# Patient Record
Sex: MALE | Race: BLACK OR AFRICAN AMERICAN | NOT HISPANIC OR LATINO | URBAN - METROPOLITAN AREA
[De-identification: names, ages, dates, MRNs, and addresses within clinical notes are randomized per-mention and may not be internally consistent; named-entity substitution may affect disease eponyms.]

---

## 2018-09-30 ENCOUNTER — EMERGENCY (EMERGENCY)
Facility: HOSPITAL | Age: 23
LOS: 1 days | Discharge: HOME | End: 2018-09-30
Attending: EMERGENCY MEDICINE
Payer: SELF-PAY

## 2018-09-30 VITALS
DIASTOLIC BLOOD PRESSURE: 91 MMHG | OXYGEN SATURATION: 100 % | HEART RATE: 121 BPM | RESPIRATION RATE: 18 BRPM | SYSTOLIC BLOOD PRESSURE: 131 MMHG | TEMPERATURE: 98 F

## 2018-09-30 VITALS
RESPIRATION RATE: 20 BRPM | DIASTOLIC BLOOD PRESSURE: 82 MMHG | OXYGEN SATURATION: 100 % | HEART RATE: 88 BPM | SYSTOLIC BLOOD PRESSURE: 108 MMHG

## 2018-09-30 LAB
ALBUMIN SERPL ELPH-MCNC: 4.8 G/DL — SIGNIFICANT CHANGE UP (ref 3.3–5)
ALP SERPL-CCNC: 71 U/L — SIGNIFICANT CHANGE UP (ref 40–120)
ALT FLD-CCNC: 18 U/L — SIGNIFICANT CHANGE UP (ref 10–45)
ANION GAP SERPL CALC-SCNC: 15 MMOL/L — SIGNIFICANT CHANGE UP (ref 5–17)
APTT BLD: 30.6 SEC — SIGNIFICANT CHANGE UP (ref 27.5–37.4)
AST SERPL-CCNC: 24 U/L — SIGNIFICANT CHANGE UP (ref 10–40)
BASOPHILS # BLD AUTO: 0 K/UL — SIGNIFICANT CHANGE UP (ref 0–0.2)
BASOPHILS NFR BLD AUTO: 0.5 % — SIGNIFICANT CHANGE UP (ref 0–2)
BILIRUB SERPL-MCNC: 0.4 MG/DL — SIGNIFICANT CHANGE UP (ref 0.2–1.2)
BLD GP AB SCN SERPL QL: NEGATIVE — SIGNIFICANT CHANGE UP
BUN SERPL-MCNC: 12 MG/DL — SIGNIFICANT CHANGE UP (ref 7–23)
CALCIUM SERPL-MCNC: 9.4 MG/DL — SIGNIFICANT CHANGE UP (ref 8.4–10.5)
CHLORIDE SERPL-SCNC: 103 MMOL/L — SIGNIFICANT CHANGE UP (ref 96–108)
CO2 SERPL-SCNC: 23 MMOL/L — SIGNIFICANT CHANGE UP (ref 22–31)
CREAT SERPL-MCNC: 1.18 MG/DL — SIGNIFICANT CHANGE UP (ref 0.5–1.3)
EOSINOPHIL # BLD AUTO: 0.1 K/UL — SIGNIFICANT CHANGE UP (ref 0–0.5)
EOSINOPHIL NFR BLD AUTO: 0.7 % — SIGNIFICANT CHANGE UP (ref 0–6)
ETHANOL SERPL-MCNC: 249 MG/DL — HIGH (ref 0–10)
GLUCOSE SERPL-MCNC: 119 MG/DL — HIGH (ref 70–99)
HCT VFR BLD CALC: 45.5 % — SIGNIFICANT CHANGE UP (ref 39–50)
HGB BLD-MCNC: 15.2 G/DL — SIGNIFICANT CHANGE UP (ref 13–17)
INR BLD: 1.1 RATIO — SIGNIFICANT CHANGE UP (ref 0.88–1.16)
LYMPHOCYTES # BLD AUTO: 2.5 K/UL — SIGNIFICANT CHANGE UP (ref 1–3.3)
LYMPHOCYTES # BLD AUTO: 31.5 % — SIGNIFICANT CHANGE UP (ref 13–44)
MCHC RBC-ENTMCNC: 31 PG — SIGNIFICANT CHANGE UP (ref 27–34)
MCHC RBC-ENTMCNC: 33.3 GM/DL — SIGNIFICANT CHANGE UP (ref 32–36)
MCV RBC AUTO: 93 FL — SIGNIFICANT CHANGE UP (ref 80–100)
MONOCYTES # BLD AUTO: 0.5 K/UL — SIGNIFICANT CHANGE UP (ref 0–0.9)
MONOCYTES NFR BLD AUTO: 6.1 % — SIGNIFICANT CHANGE UP (ref 2–14)
NEUTROPHILS # BLD AUTO: 4.8 K/UL — SIGNIFICANT CHANGE UP (ref 1.8–7.4)
NEUTROPHILS NFR BLD AUTO: 61.3 % — SIGNIFICANT CHANGE UP (ref 43–77)
PLATELET # BLD AUTO: 241 K/UL — SIGNIFICANT CHANGE UP (ref 150–400)
POTASSIUM SERPL-MCNC: 3.1 MMOL/L — LOW (ref 3.5–5.3)
POTASSIUM SERPL-SCNC: 3.1 MMOL/L — LOW (ref 3.5–5.3)
PROT SERPL-MCNC: 7.8 G/DL — SIGNIFICANT CHANGE UP (ref 6–8.3)
PROTHROM AB SERPL-ACNC: 11.9 SEC — SIGNIFICANT CHANGE UP (ref 9.8–12.7)
RBC # BLD: 4.89 M/UL — SIGNIFICANT CHANGE UP (ref 4.2–5.8)
RBC # FLD: 12.1 % — SIGNIFICANT CHANGE UP (ref 10.3–14.5)
RH IG SCN BLD-IMP: POSITIVE — SIGNIFICANT CHANGE UP
SODIUM SERPL-SCNC: 141 MMOL/L — SIGNIFICANT CHANGE UP (ref 135–145)
WBC # BLD: 7.9 K/UL — SIGNIFICANT CHANGE UP (ref 3.8–10.5)
WBC # FLD AUTO: 7.9 K/UL — SIGNIFICANT CHANGE UP (ref 3.8–10.5)

## 2018-09-30 PROCEDURE — 85730 THROMBOPLASTIN TIME PARTIAL: CPT

## 2018-09-30 PROCEDURE — 12002 RPR S/N/AX/GEN/TRNK2.6-7.5CM: CPT

## 2018-09-30 PROCEDURE — 99053 MED SERV 10PM-8AM 24 HR FAC: CPT

## 2018-09-30 PROCEDURE — 85610 PROTHROMBIN TIME: CPT

## 2018-09-30 PROCEDURE — 99285 EMERGENCY DEPT VISIT HI MDM: CPT

## 2018-09-30 PROCEDURE — 86900 BLOOD TYPING SEROLOGIC ABO: CPT

## 2018-09-30 PROCEDURE — 99291 CRITICAL CARE FIRST HOUR: CPT | Mod: 25

## 2018-09-30 PROCEDURE — 86850 RBC ANTIBODY SCREEN: CPT

## 2018-09-30 PROCEDURE — 85027 COMPLETE CBC AUTOMATED: CPT

## 2018-09-30 PROCEDURE — 90471 IMMUNIZATION ADMIN: CPT

## 2018-09-30 PROCEDURE — 71045 X-RAY EXAM CHEST 1 VIEW: CPT

## 2018-09-30 PROCEDURE — 80307 DRUG TEST PRSMV CHEM ANLYZR: CPT

## 2018-09-30 PROCEDURE — 71045 X-RAY EXAM CHEST 1 VIEW: CPT | Mod: 26

## 2018-09-30 PROCEDURE — 90715 TDAP VACCINE 7 YRS/> IM: CPT

## 2018-09-30 PROCEDURE — 80053 COMPREHEN METABOLIC PANEL: CPT

## 2018-09-30 PROCEDURE — 86901 BLOOD TYPING SEROLOGIC RH(D): CPT

## 2018-09-30 RX ORDER — TETANUS TOXOID, REDUCED DIPHTHERIA TOXOID AND ACELLULAR PERTUSSIS VACCINE, ADSORBED 5; 2.5; 8; 8; 2.5 [IU]/.5ML; [IU]/.5ML; UG/.5ML; UG/.5ML; UG/.5ML
0.5 SUSPENSION INTRAMUSCULAR ONCE
Qty: 0 | Refills: 0 | Status: COMPLETED | OUTPATIENT
Start: 2018-09-30 | End: 2018-09-30

## 2018-09-30 RX ORDER — SODIUM CHLORIDE 9 MG/ML
1000 INJECTION INTRAMUSCULAR; INTRAVENOUS; SUBCUTANEOUS ONCE
Qty: 0 | Refills: 0 | Status: COMPLETED | OUTPATIENT
Start: 2018-09-30 | End: 2018-09-30

## 2018-09-30 RX ADMIN — SODIUM CHLORIDE 2000 MILLILITER(S): 9 INJECTION INTRAMUSCULAR; INTRAVENOUS; SUBCUTANEOUS at 03:04

## 2018-09-30 RX ADMIN — TETANUS TOXOID, REDUCED DIPHTHERIA TOXOID AND ACELLULAR PERTUSSIS VACCINE, ADSORBED 0.5 MILLILITER(S): 5; 2.5; 8; 8; 2.5 SUSPENSION INTRAMUSCULAR at 05:17
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
## 2018-09-30 NOTE — PROCEDURE NOTE - ADDITIONAL PROCEDURE DETAILS
Patient prepped and draped in semi sterile fashion in emergency room. Wound washed out copiously with saline. Wound infiltrated with 1% lidocaine. Approximately 12 vertical mattress sutures placed with wound well approximated with skin edges everted. Dry dressing placed. Patient given verbal instructed to return to ED if erythematous, drainage, coming from the wound. Told Patient to follow up with Dr. Meneses in clinic in 2 weeks for suture removal or can return to ED for wound check.
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
## 2018-09-30 NOTE — ED ADULT NURSE NOTE - NSIMPLEMENTINTERV_GEN_ALL_ED
Implemented All Universal Safety Interventions:  [place] to call system. Call bell, personal items and telephone within reach. Instruct patient to call for assistance. Room bathroom lighting operational. Non-slip footwear when patient is off stretcher. Physically safe environment: no spills, clutter or unnecessary equipment. Stretcher in lowest position, wheels locked, appropriate side rails in place.
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
## 2018-09-30 NOTE — PROGRESS NOTE ADULT - SUBJECTIVE AND OBJECTIVE BOX
Asked to evaluate penetrating injury right hand    Wound explored  Transection EPL, EIP and EDC to IF  Wound Closed and splint placed    Will need repair of extensor tendons as outpatient this week  Patient needs to call [phone] First thing Monday morning to schedule followup and for surgical planning    Elevation  Keep splint on and Dry
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
## 2018-10-03 ENCOUNTER — OUTPATIENT (OUTPATIENT)
Dept: OUTPATIENT SERVICES | Facility: HOSPITAL | Age: 23
LOS: 1 days | End: 2018-10-03
Payer: COMMERCIAL

## 2018-10-03 VITALS
RESPIRATION RATE: 18 BRPM | TEMPERATURE: 98 F | OXYGEN SATURATION: 97 % | DIASTOLIC BLOOD PRESSURE: 71 MMHG | SYSTOLIC BLOOD PRESSURE: 108 MMHG | HEIGHT: 67 IN | HEART RATE: 85 BPM | WEIGHT: 164.02 LBS

## 2018-10-03 DIAGNOSIS — Z01.818 ENCOUNTER FOR OTHER PREPROCEDURAL EXAMINATION: ICD-10-CM

## 2018-10-03 DIAGNOSIS — S66.300A: ICD-10-CM

## 2018-10-03 DIAGNOSIS — S91.101A UNSPECIFIED OPEN WOUND OF RIGHT GREAT TOE WITHOUT DAMAGE TO NAIL, INITIAL ENCOUNTER: ICD-10-CM

## 2018-10-03 DIAGNOSIS — M67.90 UNSPECIFIED DISORDER OF SYNOVIUM AND TENDON, UNSPECIFIED SITE: ICD-10-CM

## 2018-10-03 LAB
HCT VFR BLD CALC: 41.6 % — SIGNIFICANT CHANGE UP (ref 39–50)
HGB BLD-MCNC: 13.7 G/DL — SIGNIFICANT CHANGE UP (ref 13–17)
MCHC RBC-ENTMCNC: 30.6 PG — SIGNIFICANT CHANGE UP (ref 27–34)
MCHC RBC-ENTMCNC: 32.9 GM/DL — SIGNIFICANT CHANGE UP (ref 32–36)
MCV RBC AUTO: 93.1 FL — SIGNIFICANT CHANGE UP (ref 80–100)
PLATELET # BLD AUTO: 235 K/UL — SIGNIFICANT CHANGE UP (ref 150–400)
RBC # BLD: 4.47 M/UL — SIGNIFICANT CHANGE UP (ref 4.2–5.8)
RBC # FLD: 12.9 % — SIGNIFICANT CHANGE UP (ref 10.3–14.5)
WBC # BLD: 5.27 K/UL — SIGNIFICANT CHANGE UP (ref 3.8–10.5)
WBC # FLD AUTO: 5.27 K/UL — SIGNIFICANT CHANGE UP (ref 3.8–10.5)

## 2018-10-03 PROCEDURE — 85027 COMPLETE CBC AUTOMATED: CPT

## 2018-10-03 PROCEDURE — G0463: CPT

## 2018-10-03 RX ORDER — LIDOCAINE HCL 20 MG/ML
0.2 VIAL (ML) INJECTION ONCE
Qty: 0 | Refills: 0 | Status: DISCONTINUED | OUTPATIENT
Start: 2018-10-05 | End: 2018-10-20

## 2018-10-03 RX ORDER — CELECOXIB 200 MG/1
200 CAPSULE ORAL ONCE
Qty: 0 | Refills: 0 | Status: COMPLETED | OUTPATIENT
Start: 2018-10-05 | End: 2018-10-05

## 2018-10-03 RX ORDER — ACETAMINOPHEN 500 MG
1000 TABLET ORAL ONCE
Qty: 0 | Refills: 0 | Status: COMPLETED | OUTPATIENT
Start: 2018-10-05 | End: 2018-10-05

## 2018-10-03 RX ORDER — SODIUM CHLORIDE 9 MG/ML
3 INJECTION INTRAMUSCULAR; INTRAVENOUS; SUBCUTANEOUS EVERY 8 HOURS
Qty: 0 | Refills: 0 | Status: DISCONTINUED | OUTPATIENT
Start: 2018-10-05 | End: 2018-10-20

## 2018-10-03 NOTE — H&P PST ADULT - ADDITIONAL PE
right arm with an ace bandage. fingers warm to touch/ brisk capillary refill/ well healing sutures behind left ear
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
## 2018-10-03 NOTE — H&P PST ADULT - NSANTHOSAYNRD_GEN_A_CORE
No. FRENCH screening performed.  STOP BANG Legend: 0-2 = LOW Risk; 3-4 = INTERMEDIATE Risk; 5-8 = HIGH Risk
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
## 2018-10-05 ENCOUNTER — OUTPATIENT (OUTPATIENT)
Dept: OUTPATIENT SERVICES | Facility: HOSPITAL | Age: 23
LOS: 1 days | End: 2018-10-05
Payer: COMMERCIAL

## 2018-10-05 VITALS
HEIGHT: 67 IN | HEART RATE: 61 BPM | DIASTOLIC BLOOD PRESSURE: 70 MMHG | OXYGEN SATURATION: 99 % | SYSTOLIC BLOOD PRESSURE: 108 MMHG | RESPIRATION RATE: 18 BRPM | WEIGHT: 164.02 LBS | TEMPERATURE: 98 F

## 2018-10-05 VITALS
RESPIRATION RATE: 20 BRPM | TEMPERATURE: 98 F | HEART RATE: 86 BPM | DIASTOLIC BLOOD PRESSURE: 66 MMHG | SYSTOLIC BLOOD PRESSURE: 138 MMHG | OXYGEN SATURATION: 98 %

## 2018-10-05 DIAGNOSIS — S66.300A: ICD-10-CM

## 2018-10-05 DIAGNOSIS — S91.101A UNSPECIFIED OPEN WOUND OF RIGHT GREAT TOE WITHOUT DAMAGE TO NAIL, INITIAL ENCOUNTER: ICD-10-CM

## 2018-10-05 PROCEDURE — 26418 REPAIR FINGER TENDON: CPT | Mod: RT

## 2018-10-05 PROCEDURE — 13102 CMPLX RPR TRUNK ADDL 5CM/<: CPT

## 2018-10-05 PROCEDURE — 13101 CMPLX RPR TRUNK 2.6-7.5 CM: CPT

## 2018-10-05 RX ORDER — CEPHALEXIN 500 MG
1 CAPSULE ORAL
Qty: 0 | Refills: 0 | COMMUNITY

## 2018-10-05 RX ORDER — SODIUM CHLORIDE 9 MG/ML
1000 INJECTION, SOLUTION INTRAVENOUS
Qty: 0 | Refills: 0 | Status: DISCONTINUED | OUTPATIENT
Start: 2018-10-05 | End: 2018-10-20

## 2018-10-05 RX ORDER — OXYCODONE HYDROCHLORIDE 5 MG/1
5 TABLET ORAL ONCE
Qty: 0 | Refills: 0 | Status: DISCONTINUED | OUTPATIENT
Start: 2018-10-05 | End: 2018-10-05

## 2018-10-05 RX ORDER — CELECOXIB 200 MG/1
200 CAPSULE ORAL ONCE
Qty: 0 | Refills: 0 | Status: COMPLETED | OUTPATIENT
Start: 2018-10-05 | End: 2018-10-05

## 2018-10-05 RX ORDER — ONDANSETRON 8 MG/1
4 TABLET, FILM COATED ORAL ONCE
Qty: 0 | Refills: 0 | Status: COMPLETED | OUTPATIENT
Start: 2018-10-05 | End: 2018-10-05

## 2018-10-05 RX ADMIN — ONDANSETRON 4 MILLIGRAM(S): 8 TABLET, FILM COATED ORAL at 10:11

## 2018-10-05 RX ADMIN — CELECOXIB 200 MILLIGRAM(S): 200 CAPSULE ORAL at 10:11
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
## 2018-10-05 NOTE — PRE-ANESTHESIA EVALUATION ADULT - NSPROPOSEDPROCEDFT_GEN_ALL_CORE
Repair of right hand extensor tendon; rev. of right chest wall wound
Repair of Extensor Tendon Right Hand possible Revision of Right Chest Wall

## 2018-10-05 NOTE — PRE-ANESTHESIA EVALUATION ADULT - NSANTHOSAYNRD_GEN_A_CORE
No. FRENCH screening performed.  STOP BANG Legend: 0-2 = LOW Risk; 3-4 = INTERMEDIATE Risk; 5-8 = HIGH Risk
No. FRENCH screening performed.  STOP BANG Legend: 0-2 = LOW Risk; 3-4 = INTERMEDIATE Risk; 5-8 = HIGH Risk

## 2018-10-05 NOTE — ASU DISCHARGE PLAN (ADULT/PEDIATRIC). - MEDICATION SUMMARY - MEDICATIONS TO TAKE
I will START or STAY ON the medications listed below when I get home from the hospital:    oxyCODONE-acetaminophen 5 mg-325 mg oral tablet  -- 1  by mouth , As Needed every 4-6 hours since 9-29--30-18  -- Indication: For For pain    cephalexin 500 mg oral capsule  -- 1 cap(s) by mouth 3 times a day started 10-02-18  -- Indication: For Antibiotic

## 2018-10-05 NOTE — ASU DISCHARGE PLAN (ADULT/PEDIATRIC). - NOTIFY
Bleeding that does not stop/Swelling that continues/Persistent Nausea and Vomiting/Numbness, color, or temperature change to extremity/Inability to Tolerate Liquids or Foods
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
## 2018-10-05 NOTE — BRIEF OPERATIVE NOTE - PROCEDURE
<<-----Click on this checkbox to enter Procedure Wound closure, layered, scalp, axillae, trunk or extremities  10/05/2018    Active  LAMONTE  Nerve repair  10/05/2018    Active  ROBBIEREGMAN  Extensor tendon repair of hand  10/05/2018    Active  DBREGMAN

## 2018-10-05 NOTE — PRE-ANESTHESIA EVALUATION ADULT - ANESTHESIA, PREVIOUS REACTION, PROFILE
none/mother states she needs more medication for induction of anesthesia
none/mother states she needs more medication for induction of anesthesia

## 2018-10-05 NOTE — ASU DISCHARGE PLAN (ADULT/PEDIATRIC). - SPECIAL INSTRUCTIONS
Leave the splint on your right hand in place.  You may cover it with a plastic bag to protect it from getting wet while bathing.    Leave the outer dressing on the chest in place for 48 hours.  After 48 hours, you may remove the outer dressing; leave the paper strip bandages underneath the outer dressing in place.  You may get the chest incision wet after 48 hours but do not scrub the incision, simply allow the water to run over the incision and pat dry.

## 2018-10-05 NOTE — ASU DISCHARGE PLAN (ADULT/PEDIATRIC). - ITEMS TO FOLLOWUP WITH YOUR PHYSICIAN'S
Please follow up with Dr. Miranda next Tuesday or Thursday after discharge from the hospital. You may call [phone] to schedule an appointment.

## 2018-10-05 NOTE — BRIEF OPERATIVE NOTE - OPERATION/FINDINGS
Exploration of wound of hand.  Repair of EIP, EPL, EDC of right hand.  Repair of dorsal radial sensory nerve with 8-0 nylon.  Exploration of wound of chest with layered closure.

## 2019-09-23 NOTE — PROCEDURE NOTE - NSSITEPREP_SKIN_A_CORE
Spoke with son. Discussed current status and plan of care. Patient has baseline dementia, likely recurrent aspiration which is a marker of poor prognosis. Baseline quality of life has diminished significantly last several weeks. Son feels that goals of care would be comfort at this time. He would like to discontinue bipap and focus on comfort. He will try to be here this afternoon.    chlorhexidine

## 2022-05-26 NOTE — ASU PREOP CHECKLIST - ISOLATION PRECAUTIONS
[facility]      PROCEDURE: BBR  DATE: 4/21/22    Matt Jackson has been recovering well since her procedure. Pain has been well controlled with prescribed pain medication. EXAM    /74   Pulse 78   Temp 98.7 °F (37.1 °C)   Wt 199 lb (90.3 kg)   SpO2 98%   BMI 33.12 kg/m²     GEN: NAD  BREAST: Incisions healing well. Good contour. Nipples viable. IMP: 36 y. o.female s/p BBR  PLAN: Doing well. Patient happy thus far. Will return in 6 months to ensure wound healing. She also wishes to do an abdminoplasty in the future possible. She wants to reach her goal weight of 180 lbs and will return to discuss.          Anette Ramirez, APRN - [id number] [facility]  [phone]  05/26/22 none

## 2022-06-06 NOTE — H&P PST ADULT - PAIN CHRONIC, PROFILE
Unable to reach (Follow up)    Attempt made to reach the parent/guardian of the patient by telephone. Left message with information to return call. Episode will be resolved. No further follow up will be required at this time.
no

## 2024-08-06 NOTE — H&P PST ADULT - BLOOD AVOIDANCE/RESTRICTIONS, PROFILE
Please send letter in mail     Cologuard colon screen was negative and normal     Next one due in 3 years unless symptoms or concerns arise      Let me know if any questions.          Sent letter stating above information   none

## 2025-01-21 NOTE — ED PROCEDURE NOTE - LENGTH OF LACERATION
Lee Osorio MD, [facility]  Pulmonology and Critical Care Medicine      3 2

## 2025-02-26 NOTE — PRE-ANESTHESIA EVALUATION ADULT - NSANTHDIETYNSD_GEN_ALL_CORE
Patient: Becky Decker    Procedure: * No procedures listed *       Diagnosis: * No pre-op diagnosis entered *  Diagnosis Additional Information: No value filed.    Anesthesia Type:   General     Note:    Oropharynx: oropharynx clear of all foreign objects  Level of Consciousness: drowsy  Oxygen Supplementation: room air    Independent Airway: airway patency satisfactory and stable  Dentition: dentition unchanged  Vital Signs Stable: post-procedure vital signs reviewed and stable  Report to RN Given: handoff report given  Patient transferred to: PACU    Handoff Report: Identifed the Patient, Identified the Reponsible Provider, Reviewed the pertinent medical history, Discussed the surgical course, Reviewed Intra-OP anesthesia mangement and issues during anesthesia, Set expectations for post-procedure period and Allowed opportunity for questions and acknowledgement of understanding      Vitals:  Vitals Value Taken Time   BP     Temp     Pulse     Resp     SpO2         Electronically Signed By: Gerard Brown MD  February 26, 2025  1:42 PM  
Yes/10pm, 10/4
10pm last night/Yes

## 2025-06-24 ENCOUNTER — INPATIENT (INPATIENT)
Facility: HOSPITAL | Age: 30
LOS: 8 days | Discharge: HOME | End: 2025-07-03
Attending: STUDENT IN AN ORGANIZED HEALTH CARE EDUCATION/TRAINING PROGRAM | Admitting: STUDENT IN AN ORGANIZED HEALTH CARE EDUCATION/TRAINING PROGRAM
Payer: MEDICAID

## 2025-06-24 VITALS
SYSTOLIC BLOOD PRESSURE: 114 MMHG | OXYGEN SATURATION: 100 % | HEART RATE: 100 BPM | HEIGHT: 68 IN | DIASTOLIC BLOOD PRESSURE: 70 MMHG | WEIGHT: 149.91 LBS | TEMPERATURE: 98 F | RESPIRATION RATE: 18 BRPM

## 2025-06-24 LAB
AMPHET UR-MCNC: NEGATIVE — SIGNIFICANT CHANGE UP
BARBITURATES UR SCN-MCNC: NEGATIVE — SIGNIFICANT CHANGE UP
BENZODIAZ UR-MCNC: NEGATIVE — SIGNIFICANT CHANGE UP
COCAINE METAB.OTHER UR-MCNC: NEGATIVE — SIGNIFICANT CHANGE UP
CREATININE URINE RESULT, DAU: 28 MG/DL — SIGNIFICANT CHANGE UP
FENTANYL UR QL SCN: NEGATIVE — SIGNIFICANT CHANGE UP
METHADONE UR-MCNC: NEGATIVE — SIGNIFICANT CHANGE UP
OPIATES UR-MCNC: NEGATIVE — SIGNIFICANT CHANGE UP
OXYCODONE UR-MCNC: NEGATIVE — SIGNIFICANT CHANGE UP
PCP SPEC-MCNC: SIGNIFICANT CHANGE UP
PCP UR-MCNC: NEGATIVE — SIGNIFICANT CHANGE UP
THC UR QL: NEGATIVE — SIGNIFICANT CHANGE UP

## 2025-06-24 RX ORDER — KETOROLAC TROMETHAMINE 30 MG/ML
30 INJECTION, SOLUTION INTRAMUSCULAR; INTRAVENOUS ONCE
Refills: 0 | Status: DISCONTINUED | OUTPATIENT
Start: 2025-06-24 | End: 2025-06-25

## 2025-06-24 NOTE — ED ADULT TRIAGE NOTE - CHIEF COMPLAINT QUOTE
s/p basketball injury earlier today, pt states he did not fall but has noticed pain and swelling in right ankle and right knee, knee visibly swollen, pt unable to bear weight on affected left. Pt denies CP, SOB, N/V/D. Fever and Chills. Denies Hx

## 2025-06-24 NOTE — ED ADULT NURSE NOTE - OBJECTIVE STATEMENT
Pt presents to wellness c/o right knee pain & swelling after playing basketball and coming down hard on right leg. Pt having difficulty bearing weight. Pt also requesting a drug screen before XR and pain meds. states he does not do drugs. urine sent. pending XR res

## 2025-06-24 NOTE — ED PROVIDER NOTE - MUSCULOSKELETAL, MLM
Moderate edema of R knee with decreased ROM. No bony tenderness. intact distal pulses. No ecchymosis or erythema.

## 2025-06-24 NOTE — ED PROVIDER NOTE - PROGRESS NOTE DETAILS
Pt transferred to main ED given psych concerns. Offered knee immobilizer but pt is refusing.   Psych to evaluate. Pt placed on 1:1. Unable to go into  given unable to ambulate due to knee injury

## 2025-06-24 NOTE — ED PROVIDER NOTE - OBJECTIVE STATEMENT
29-year-old male no reported past medical history presents to the emergency department right knee injury.  Patient states he was playing basketball and came down on right knee.  Patient having pain and swelling since.  Patient have a decreased range of motion of right knee.  Patient reports moderate amount of swelling.  Patient denies any other injury or complaint at this time. 29-year-old male no reported past medical history presents to the emergency department right knee injury.  Patient states he was playing basketball and came down on right knee.  Patient having pain and swelling since.  Patient have a decreased range of motion of right knee.  Patient reports moderate amount of swelling.  Patient denies any other injury or complaint at this time.  Pt also requesting drug screen however does not report using any drugs, 29-year-old male no reported past medical history presents to the emergency department right knee injury.  Patient states he was playing basketball and came down on right knee.  Patient having pain and swelling since.  Patient have a decreased range of motion of right knee.  Patient reports moderate amount of swelling.  Patient denies any other injury or complaint at this time.  Pt also requesting drug screen however does not report using any drugs. feels paranoid that he might have been drugged.   Spoke with mother for collateral who states pt has been very paranoid lately, feeling like he is being watched and followed by family. Mother states he stays in basement most of the day and can become verbally aggressive. Mother is concerned about her safety and her family's safety.

## 2025-06-24 NOTE — ED PROVIDER NOTE - NSFOLLOWUPINSTRUCTIONS_ED_ALL_ED_FT
Follow up with either sports medicine clinic or orthopedics within 1 week  Apply Ice daily   keep knee in immobilizer. Use crutches to ambulate      Sprain    A sprain is a stretch or tear in one of the tough, fiber-like tissues (ligaments) in your body. This is caused by an injury to the area such as a twisting mechanism. Symptoms include pain, swelling, or bruising. Rest that area over the next several days and slowly resume activity when tolerated. Ice can help with swelling and pain.     SEEK IMMEDIATE MEDICAL CARE IF YOU HAVE ANY OF THE FOLLOWING SYMPTOMS: worsening pain, inability to move that body part, numbness or tingling.

## 2025-06-24 NOTE — ED PROVIDER NOTE - CLINICAL SUMMARY MEDICAL DECISION MAKING FREE TEXT BOX
29-year-old male no reported past medical history presents to the emergency department right knee injury.  Patient states he was playing basketball and came down on right knee.  Patient having pain and swelling since.  Patient have a decreased range of motion of right knee.  Patient reports moderate amount of swelling.  Patient denies any other injury or complaint at this time.    likely ligamental injury    plan  - xr knee  - knee immobilizer   - ice, ace bandage  - toradol  - follow up with sports medicine, ortho 29-year-old male no reported past medical history presents to the emergency department right knee injury.  Patient states he was playing basketball and came down on right knee.  Patient having pain and swelling since.  Patient have a decreased range of motion of right knee.  Patient reports moderate amount of swelling.  Patient denies any other injury or complaint at this time.  Pt also requesting drug screen however does not report using any drugs,     likely ligamental injury    plan  - xr knee  - knee immobilizer   - ice, ace bandage  - toradol  - follow up with sports medicine, ortho 29-year-old male no reported past medical history presents to the emergency department right knee injury.  Patient states he was playing basketball and came down on right knee.  Patient having pain and swelling since.  Patient have a decreased range of motion of right knee.  Patient reports moderate amount of swelling.  Patient denies any other injury or complaint at this time.  Pt also requesting drug screen however does not report using any drugs. feels paranoid that he might have been drugged.   Spoke with mother for collateral who states pt has been very paranoid lately, feeling like he is being watched and followed by family. Mother states he stays in basement most of the day and can become verbally aggressive. Mother is concerned about her safety and her family's safety.     likely ligamental injury    plan  xr RLE  psych  consult given paranoid delusions  will move to main on 1:1
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
## 2025-06-24 NOTE — ED PROVIDER NOTE - NEUROLOGICAL, MLM
Tretinoin Approved    Filling Pharmacy: [facility]   Alert and oriented, no focal deficits, no motor or sensory deficits.

## 2025-06-25 DIAGNOSIS — F29 UNSPECIFIED PSYCHOSIS NOT DUE TO A SUBSTANCE OR KNOWN PHYSIOLOGICAL CONDITION: ICD-10-CM

## 2025-06-25 LAB
ALBUMIN SERPL ELPH-MCNC: 4.7 G/DL — SIGNIFICANT CHANGE UP (ref 3.3–5)
ALP SERPL-CCNC: 57 U/L — SIGNIFICANT CHANGE UP (ref 40–120)
ALT FLD-CCNC: 17 U/L — SIGNIFICANT CHANGE UP (ref 4–41)
ANION GAP SERPL CALC-SCNC: 16 MMOL/L — HIGH (ref 7–14)
APAP SERPL-MCNC: <10 UG/ML — LOW (ref 15–25)
AST SERPL-CCNC: 27 U/L — SIGNIFICANT CHANGE UP (ref 4–40)
BASOPHILS # BLD AUTO: 0.05 K/UL — SIGNIFICANT CHANGE UP (ref 0–0.2)
BASOPHILS NFR BLD AUTO: 0.5 % — SIGNIFICANT CHANGE UP (ref 0–2)
BILIRUB SERPL-MCNC: 1.8 MG/DL — HIGH (ref 0.2–1.2)
BUN SERPL-MCNC: 6 MG/DL — LOW (ref 7–23)
CALCIUM SERPL-MCNC: 9 MG/DL — SIGNIFICANT CHANGE UP (ref 8.4–10.5)
CHLORIDE SERPL-SCNC: 102 MMOL/L — SIGNIFICANT CHANGE UP (ref 98–107)
CO2 SERPL-SCNC: 20 MMOL/L — LOW (ref 22–31)
CREAT SERPL-MCNC: 0.9 MG/DL — SIGNIFICANT CHANGE UP (ref 0.5–1.3)
EGFR: 119 ML/MIN/1.73M2 — SIGNIFICANT CHANGE UP
EGFR: 119 ML/MIN/1.73M2 — SIGNIFICANT CHANGE UP
EOSINOPHIL # BLD AUTO: 0.02 K/UL — SIGNIFICANT CHANGE UP (ref 0–0.5)
EOSINOPHIL NFR BLD AUTO: 0.2 % — SIGNIFICANT CHANGE UP (ref 0–6)
ETHANOL SERPL-MCNC: <10 MG/DL — SIGNIFICANT CHANGE UP
GLUCOSE SERPL-MCNC: 85 MG/DL — SIGNIFICANT CHANGE UP (ref 70–99)
HCT VFR BLD CALC: 40.7 % — SIGNIFICANT CHANGE UP (ref 39–50)
HGB BLD-MCNC: 14 G/DL — SIGNIFICANT CHANGE UP (ref 13–17)
IMM GRANULOCYTES # BLD AUTO: 0.02 K/UL — SIGNIFICANT CHANGE UP (ref 0–0.07)
IMM GRANULOCYTES NFR BLD AUTO: 0.2 % — SIGNIFICANT CHANGE UP (ref 0–0.9)
LYMPHOCYTES # BLD AUTO: 1.09 K/UL — SIGNIFICANT CHANGE UP (ref 1–3.3)
LYMPHOCYTES NFR BLD AUTO: 10.3 % — LOW (ref 13–44)
MCHC RBC-ENTMCNC: 31.3 PG — SIGNIFICANT CHANGE UP (ref 27–34)
MCHC RBC-ENTMCNC: 34.4 G/DL — SIGNIFICANT CHANGE UP (ref 32–36)
MCV RBC AUTO: 90.8 FL — SIGNIFICANT CHANGE UP (ref 80–100)
MONOCYTES # BLD AUTO: 0.66 K/UL — SIGNIFICANT CHANGE UP (ref 0–0.9)
MONOCYTES NFR BLD AUTO: 6.2 % — SIGNIFICANT CHANGE UP (ref 2–14)
NEUTROPHILS # BLD AUTO: 8.77 K/UL — HIGH (ref 1.8–7.4)
NEUTROPHILS NFR BLD AUTO: 82.6 % — HIGH (ref 43–77)
NRBC # BLD AUTO: 0 K/UL — SIGNIFICANT CHANGE UP (ref 0–0)
NRBC # FLD: 0 K/UL — SIGNIFICANT CHANGE UP (ref 0–0)
NRBC BLD AUTO-RTO: 0 /100 WBCS — SIGNIFICANT CHANGE UP (ref 0–0)
PLATELET # BLD AUTO: 188 K/UL — SIGNIFICANT CHANGE UP (ref 150–400)
PMV BLD: 10.6 FL — SIGNIFICANT CHANGE UP (ref 7–13)
POTASSIUM SERPL-MCNC: 3.4 MMOL/L — LOW (ref 3.5–5.3)
POTASSIUM SERPL-SCNC: 3.4 MMOL/L — LOW (ref 3.5–5.3)
PROT SERPL-MCNC: 7.3 G/DL — SIGNIFICANT CHANGE UP (ref 6–8.3)
RBC # BLD: 4.48 M/UL — SIGNIFICANT CHANGE UP (ref 4.2–5.8)
RBC # FLD: 12.3 % — SIGNIFICANT CHANGE UP (ref 10.3–14.5)
SALICYLATES SERPL-MCNC: <0.3 MG/DL — LOW (ref 15–30)
SODIUM SERPL-SCNC: 138 MMOL/L — SIGNIFICANT CHANGE UP (ref 135–145)
TOXICOLOGY SCREEN, DRUGS OF ABUSE, SERUM RESULT: SIGNIFICANT CHANGE UP
TSH SERPL-MCNC: 1.06 UIU/ML — SIGNIFICANT CHANGE UP (ref 0.27–4.2)
WBC # BLD: 10.61 K/UL — HIGH (ref 3.8–10.5)
WBC # FLD AUTO: 10.61 K/UL — HIGH (ref 3.8–10.5)

## 2025-06-25 PROCEDURE — 99285 EMERGENCY DEPT VISIT HI MDM: CPT

## 2025-06-25 RX ORDER — HALOPERIDOL 10 MG/1
5 TABLET ORAL ONCE
Refills: 0 | Status: DISCONTINUED | OUTPATIENT
Start: 2025-06-25 | End: 2025-06-25

## 2025-06-25 RX ORDER — LORAZEPAM 4 MG/ML
2 VIAL (ML) INJECTION ONCE
Refills: 0 | Status: DISCONTINUED | OUTPATIENT
Start: 2025-06-25 | End: 2025-06-25

## 2025-06-25 RX ORDER — HALOPERIDOL 10 MG/1
5 TABLET ORAL EVERY 6 HOURS
Refills: 0 | Status: DISCONTINUED | OUTPATIENT
Start: 2025-06-25 | End: 2025-07-03

## 2025-06-25 RX ORDER — LORAZEPAM 4 MG/ML
2 VIAL (ML) INJECTION ONCE
Refills: 0 | Status: DISCONTINUED | OUTPATIENT
Start: 2025-06-25 | End: 2025-07-01

## 2025-06-25 RX ORDER — HALOPERIDOL 10 MG/1
5 TABLET ORAL ONCE
Refills: 0 | Status: DISCONTINUED | OUTPATIENT
Start: 2025-06-25 | End: 2025-07-03

## 2025-06-25 RX ORDER — RISPERIDONE 4 MG
1 TABLET ORAL AT BEDTIME
Refills: 0 | Status: DISCONTINUED | OUTPATIENT
Start: 2025-06-25 | End: 2025-06-30

## 2025-06-25 RX ORDER — LORAZEPAM 4 MG/ML
2 VIAL (ML) INJECTION EVERY 6 HOURS
Refills: 0 | Status: DISCONTINUED | OUTPATIENT
Start: 2025-06-25 | End: 2025-07-01

## 2025-06-25 RX ADMIN — KETOROLAC TROMETHAMINE 30 MILLIGRAM(S): 30 INJECTION, SOLUTION INTRAMUSCULAR; INTRAVENOUS at 10:35

## 2025-06-25 NOTE — BH INPATIENT PSYCHIATRY ASSESSMENT NOTE - NSBHLEGALSTATUSDT_PSY_ALL_CORE
[FreeTextEntry1] : 3 views of the patient's left foot ordered and reviewed by me personally.  X-rays demonstrate evidence of a well corrected hallux valgus deformity status post minimally invasive Akin and chevron osteotomy.
25-Jun-2025

## 2025-06-25 NOTE — BH INPATIENT PSYCHIATRY ASSESSMENT NOTE - NSBHCHARTREVIEWVS_PSY_A_CORE FT
Vital Signs Last 24 Hrs  T(C): 37.2 (06-25-25 @ 10:04), Max: 37.2 (06-25-25 @ 10:04)  T(F): 99 (06-25-25 @ 10:04), Max: 99 (06-25-25 @ 10:04)  HR: 89 (06-25-25 @ 10:04) (80 - 100)  BP: 113/72 (06-25-25 @ 10:04) (110/60 - 129/71)  BP(mean): --  RR: 18 (06-25-25 @ 10:04) (16 - 18)  SpO2: 100% (06-25-25 @ 10:04) (95% - 100%)     Vital Signs Last 24 Hrs  T(C): 36.7 (06-25-25 @ 12:35), Max: 37.2 (06-25-25 @ 10:04)  T(F): 98.1 (06-25-25 @ 12:35), Max: 99 (06-25-25 @ 10:04)  HR: 89 (06-25-25 @ 10:04) (80 - 100)  BP: 113/72 (06-25-25 @ 10:04) (110/60 - 129/71)  BP(mean): --  RR: 16 (06-25-25 @ 12:35) (16 - 18)  SpO2: 100% (06-25-25 @ 10:04) (95% - 100%)    Orthostatic VS  06-25-25 @ 12:35  Lying BP: --/-- HR: --  Sitting BP: 122/86 HR: 87  Standing BP: 121/86 HR: 106  Site: upper left arm  Mode: electronic   Vital Signs Last 24 Hrs  T(C): 37.2 (06-26-25 @ 08:37), Max: 37.2 (06-26-25 @ 08:37)  T(F): 98.9 (06-26-25 @ 08:37), Max: 98.9 (06-26-25 @ 08:37)  HR: --  BP: --  BP(mean): --  RR: 18 (06-26-25 @ 07:34) (16 - 18)  SpO2: --    Orthostatic VS  06-26-25 @ 08:37  Lying BP: --/-- HR: --  Sitting BP: 115/84 HR: 97  Standing BP: 117/79 HR: 114  Site: --  Mode: electronic  Orthostatic VS  06-25-25 @ 12:35  Lying BP: --/-- HR: --  Sitting BP: 122/86 HR: 87  Standing BP: 121/86 HR: 106  Site: upper left arm  Mode: electronic

## 2025-06-25 NOTE — ED BEHAVIORAL HEALTH ASSESSMENT NOTE - DESCRIPTION
denies Lives with family in [place], employed, never , poor eye contact, wearing dark glasses.   ICU Vital Signs Last 24 Hrs  T(C): 36.7 (24 Jun 2025 20:45), Max: 36.7 (24 Jun 2025 20:45)  T(F): 98 (24 Jun 2025 20:45), Max: 98 (24 Jun 2025 20:45)  HR: 100 (24 Jun 2025 20:45) (100 - 100)  BP: 114/70 (24 Jun 2025 20:45) (114/70 - 114/70)  BP(mean): --  ABP: --  ABP(mean): --  RR: 18 (24 Jun 2025 20:45) (18 - 18)  SpO2: 100% (24 Jun 2025 20:45) (100% - 100%)    O2 Parameters below as of 24 Jun 2025 20:45  Patient On (Oxygen Delivery Method): room air Lives with family in [place], employed, never , no reported access to guns
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
## 2025-06-25 NOTE — ED BEHAVIORAL HEALTH NOTE - BEHAVIORAL HEALTH NOTE
[facility]  Reference #: [id number] - NO CONTROLLED SUBSTANCES PRESCRIBED     PSYCKES: NONE    [facility] UNIFIED COURT SYSTEM/ [employer] - no pending legal issues listed

## 2025-06-25 NOTE — ED BEHAVIORAL HEALTH ASSESSMENT NOTE - SUMMARY
Patient is a 29 year old, male; domicile with parents and siblings; single; noncaregiver; employed; never received formal psychiatric treatment,  no hospitalizations; no known suicide attempts; ; no active substance abuse or known history of complicated withdrawal; h/o aggression with family and friends; PMH unremarkable; arrived by [employer] for c/o left knee pain; psychiatry consulted for paranoia.     Patient seen and evaluated. He was observed wearing dark sunglasses with poor eye contact throughout interview. He appeared paranoid and refused to take off his glasses or change into a hospital gown. Patient was superficially guarded and denied all symptoms of psychosis, depression and rody.   Collateral from mother and sister reported patient has been showing symptoms of psychosis for several years but refused treatment. Recently patient has been getting aggressive to family members, laughing to self and making bizarre statements. Patient has made repeated statements that's random people are out to get him and feels unsafe in his home. Patient is not sleeping and barely eating and lost a lot of weight. Family feels patient is a danger to self and others and are advocating for admission.   Patient is not in agreement and will be admitted on an involuntary status. At this time there are no beds and patient will board in the ED. Patient is a 29 year old, male; domicile with parents and siblings; single; noncaregiver; employed; never received formal psychiatric treatment,  no hospitalizations; no known suicide attempts; ; no active substance abuse or known history of complicated withdrawal; h/o aggression with family and friends; PMH unremarkable; reportedly arrived by [employer] for c/o knee pain; psychiatry consulted for paranoia/ agitation    Patient seen and evaluated. He was observed wearing dark sunglasses with poor eye contact throughout interview. He appeared paranoid and refused to take off his glasses or change into a hospital gown. Patient was superficially guarded and denied all symptoms of psychosis, depression and rody.     Collateral from mother and sister reported patient has been showing symptoms of psychosis for several years but refused treatment. Recently patient has been getting aggressive to family members, laughing to self and making bizarre statements. Patient has made repeated statements that's random people are out to get him and feels unsafe in his home. Patient is not sleeping and barely eating and lost a lot of weight. Family feels patient is a danger to self and others and are advocating for admission.     Patient is not in agreement and will be admitted on an involuntary status. At this time there are no beds and patient will board in the ED. Patient is a 29 year old, male; domicile with parents and siblings; single; noncaregiver; employed; never received formal psychiatric treatment,  no hospitalizations; no known suicide attempts; ; no active substance abuse or known history of complicated withdrawal; h/o aggression with family and friends; PMH unremarkable; reportedly arrived by [employer] for c/o knee pain; psychiatry consulted for paranoia/ agitation    Patient seen and evaluated. He was observed wearing dark sunglasses with poor eye contact throughout interview. He appeared paranoid and refused to take off his glasses or change into a hospital gown. Patient was superficially guarded and denied all symptoms of psychosis, depression and rody.     Collateral from mother and sister reported patient has been showing symptoms of psychosis for several years but refused treatment. Recently patient has been getting aggressive to family members, laughing to self and making bizarre statements. Patient has made repeated statements that's random people are out to get him and feels unsafe in his home. Patient is not sleeping and barely eating and lost a lot of weight. Family feels patient is a danger to self and others and are advocating for admission.     Patient is not in agreement and will be admitted on an involuntary status.
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
## 2025-06-25 NOTE — ED ADULT NURSE REASSESSMENT NOTE - NS ED NURSE REASSESS COMMENT FT1
830am: Received report from PM RN. Patient is alert and oriented times 3. Patient offered and accepted breakfast. Patient is calm and cooperative. Waiting for acceptance to [facility] and bed to be cleared.  SERENA Johnson
Belongings and valuables collected and secured in  high acuity intake area [address].
Pt calm and cooperative, to be transported to [facility] with PES and security, Belongings returned, EKG complete, report given to RN at [facility]
Patient is received from  and was started on constant observation upon arrival. Patient is currently denying any SI, HI, A/V hallucinations. Denying any paranoia at this time, reports that the only reason he feels he needs to be in the hospital is because of the pain in his right knee from the basketball injury that occurred earlier today. Patient has a 18 gauge IV placed to his left AC, lab work sent. Denying any pain medication at this time. Pending Xray results. plan of care ongoing.
Advanced care planning was discussed with patient and family.  Advanced care planning forms were reviewed and discussed as appropriate.  Differential diagnosis and plan of care discussed with patient after the evaluation.   Pain assessed and judicious use of narcotics when appropriate was discussed.  Importance of Fall prevention discussed.  Counseling on Smoking and Alcohol cessation was offered when appropriate.  Counseling on Diet, exercise, and medication compliance was done.     Approx 60 minutes spent.

## 2025-06-25 NOTE — BH INPATIENT PSYCHIATRY ASSESSMENT NOTE - CURRENT MEDICATION
MEDICATIONS  (STANDING):    MEDICATIONS  (PRN):   MEDICATIONS  (STANDING):  risperiDONE   Tablet 1 milliGRAM(s) Oral at bedtime    MEDICATIONS  (PRN):  haloperidol     Tablet 5 milliGRAM(s) Oral every 6 hours PRN Mild-moderate agitation, secondary to psychosis, rody, or poor impulse control  haloperidol    Injectable 5 milliGRAM(s) IntraMuscular once PRN Severe agitation secondary to  neuropsychiatric disorder  LORazepam     Tablet 2 milliGRAM(s) Oral every 6 hours PRN Mild-moderate agitation secondary to psychosis, rody, or poor impulse, Mild-moderate anxiety  LORazepam   Injectable 2 milliGRAM(s) IntraMuscular once PRN Severe agitation secondary to psychosis, rody or poor impulse control. Severe anxiety

## 2025-06-25 NOTE — BH INPATIENT PSYCHIATRY ASSESSMENT NOTE - OTHER
guarded and superficial throughout interview not obseved ambulating with walker, reports pain in right knee and foot

## 2025-06-25 NOTE — ED BEHAVIORAL HEALTH ASSESSMENT NOTE - PSYCHIATRIC ISSUES AND PLAN (INCLUDE STANDING AND PRN MEDICATION)
will defer to treatment team - recommend Risperdal 1 mg hs and titrate as tolderated. Ativan 2 mg Haldol 5 mg po/i,m q6h prn agitation will defer to treatment team - recommend Risperdal 1 mg hs and titrate as tolderated. Ativan 2 mg Haldol 5 mg  po/i,m q6h prn agitation If giving Haldol monitor QTC and  hodl if QTC is greater then 500 will defer to treatment team - recommend Risperdal 1 mg hs and titrate as tolerated. Ativan 2 mg Haldol 5 mg  po/i,m q6h prn agitation If giving Haldol monitor QTC and  hodl if QTC is greater then 500m/s

## 2025-06-25 NOTE — ED BEHAVIORAL HEALTH ASSESSMENT NOTE - RISK ASSESSMENT
low risk- denies suicidal ideation or depressed mood, no prior suicide attempts, afraid of dying, has supportive family,   risk- psychosis

## 2025-06-25 NOTE — BH INPATIENT PSYCHIATRY ASSESSMENT NOTE - NSBHCHARTREVIEWLAB_PSY_A_CORE FT
The patient is a 18y Male complaining of allergic reaction. Admission labs reviewed no acute findings  BAL <10   utox negative  UA unremarkable  TSH 1.03  WBC mildly elevated at 10.61 but pt denying constitutional sxs,

## 2025-06-25 NOTE — BH INPATIENT PSYCHIATRY ASSESSMENT NOTE - NSBHATTESTTYPEVISIT_PSY_A_CORE
Attending evaluating patient with TIFFANIE ([id number] code) On-site Attending supervising TIFFANIE ([id number] codes)

## 2025-06-25 NOTE — ED BEHAVIORAL HEALTH ASSESSMENT NOTE - NSBHATTESTCOMMENTATTENDFT_PSY_A_CORE
29/M with no established psych hx; denied being in psych care; has no reported hx of in-pt psych admissions; denied any hx of SA nor engaged in any NSSIB; denied any illicit substance use including alcohol.  no pertinent medical issues.  tonight, presented to the ED accompanied by family complaining of right knee pain + swelling as well as right ankle pain following basketball injury earlier today.  Psych was consulted as family raised concern that Pt has been increasingly paranoid, aggressive and threatening    at this time, Pt is exhibiting hallmarks of psychosis manifesting as illogicality in his TP; has impaired reasoning.  he is emotionally dysregulated (which is psychotically driven) and remains unpredictable.  Pt is paranoid; attempts at minimizing symptoms; is suspicious and guarded. with poor insight and impaired judgement.  collateral information was obtained from his family who reported that the Pt has been increasingly isolating self and struggling with his ADLs. Mother reported Pt is paranoid, e.g. reported that Pt feels being monitored by unknown individuals, putting devices in his room.    currently, does not appear to be severely depressed; does exhibit anxiety/ jitteriness but this is NOT due to a primary affective symptom.  rather, anxiety is borne out of his paranoia.  Pt is not manic.  does not appear to be acutely intoxicated.  is not delirious nor catatonic.     Ongoing psychotic symptoms causing severe functional impairment.  more so, given psychotic agitation, Pt poses a risk to others.  he will benefit from psych admission aimed at stabilization and ensuring safety.  once he is medically cleared, to facilitate transfer to in-Pt psych unit for further management (on 9.27 status - mother is applicant)       RECOMMENDATIONS:   1. defer standing psychotropic to primary treatment team  2. PRNs: haldol 5mg PO/IM + ativan 2mg PO/IM q6Hrs for psychotic agitation. defer antipsychotic if there is sustained qtc prolongation at >/= 500m/s  3. pain meds PRN  4. temporarily boarding at the ED as no beds available   - discussed with ED staff

## 2025-06-25 NOTE — BH INPATIENT PSYCHIATRY ASSESSMENT NOTE - NSBHMETABOLIC_PSY_ALL_CORE_FT
BMI: BMI (kg/m2): 22.8 (06-24-25 @ 20:45)  HbA1c:   Glucose:   BP: 113/72 (06-25-25 @ 10:04) (110/60 - 129/71)Vital Signs Last 24 Hrs  T(C): 37.2 (06-25-25 @ 10:04), Max: 37.2 (06-25-25 @ 10:04)  T(F): 99 (06-25-25 @ 10:04), Max: 99 (06-25-25 @ 10:04)  HR: 89 (06-25-25 @ 10:04) (80 - 100)  BP: 113/72 (06-25-25 @ 10:04) (110/60 - 129/71)  BP(mean): --  RR: 18 (06-25-25 @ 10:04) (16 - 18)  SpO2: 100% (06-25-25 @ 10:04) (95% - 100%)      Lipid Panel:  BMI: BMI (kg/m2): 22.9 (06-25-25 @ 12:35)  HbA1c:   Glucose:   BP: 113/72 (06-25-25 @ 10:04) (110/60 - 129/71)Vital Signs Last 24 Hrs  T(C): 36.7 (06-25-25 @ 12:35), Max: 37.2 (06-25-25 @ 10:04)  T(F): 98.1 (06-25-25 @ 12:35), Max: 99 (06-25-25 @ 10:04)  HR: 89 (06-25-25 @ 10:04) (80 - 100)  BP: 113/72 (06-25-25 @ 10:04) (110/60 - 129/71)  BP(mean): --  RR: 16 (06-25-25 @ 12:35) (16 - 18)  SpO2: 100% (06-25-25 @ 10:04) (95% - 100%)    Orthostatic VS  06-25-25 @ 12:35  Lying BP: --/-- HR: --  Sitting BP: 122/86 HR: 87  Standing BP: 121/86 HR: 106  Site: upper left arm  Mode: electronic    Lipid Panel:  BMI: BMI (kg/m2): 22.9 (06-25-25 @ 12:35)  HbA1c: A1C with Estimated Average Glucose Result: 5.4 % (06-26-25 @ 08:58)    Glucose:   BP: 113/72 (06-25-25 @ 10:04) (110/60 - 129/71)Vital Signs Last 24 Hrs  T(C): 37.2 (06-26-25 @ 08:37), Max: 37.2 (06-26-25 @ 08:37)  T(F): 98.9 (06-26-25 @ 08:37), Max: 98.9 (06-26-25 @ 08:37)  HR: --  BP: --  BP(mean): --  RR: 18 (06-26-25 @ 07:34) (16 - 18)  SpO2: --    Orthostatic VS  06-26-25 @ 08:37  Lying BP: --/-- HR: --  Sitting BP: 115/84 HR: 97  Standing BP: 117/79 HR: 114  Site: --  Mode: electronic  Orthostatic VS  06-25-25 @ 12:35  Lying BP: --/-- HR: --  Sitting BP: 122/86 HR: 87  Standing BP: 121/86 HR: 106  Site: upper left arm  Mode: electronic    Lipid Panel: Date/Time: 06-26-25 @ 08:58  Cholesterol, Serum: 116  LDL Cholesterol Calculated: 32  HDL Cholesterol, Serum: 70  Total Cholesterol/HDL Ration Measurement: --  Triglycerides, Serum: 66

## 2025-06-25 NOTE — BH INPATIENT PSYCHIATRY ASSESSMENT NOTE - ATTENDING COMMENTS
29 year old single male, domiciled n private residence with parents and siblings, no formal PPH, No substance use, no prior inpatient hospitalizationd, pt arrived by Uber for c/o knee pain, medically cleared, psychiatry consulted for paranoia and agitation and admitted to [facility] for further stabilization. On exam, pt is guarded odd, however minimizes psychiatric symptoms, denies AVH/SI/HI. Dx c/w unspecified psychotic d/o, start Risperdal 1 mg qhs
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
## 2025-06-25 NOTE — BH PATIENT PROFILE - FALL HARM RISK - HARM RISK INTERVENTIONS
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
## 2025-06-25 NOTE — ED BEHAVIORAL HEALTH ASSESSMENT NOTE - HPI (INCLUDE ILLNESS QUALITY, SEVERITY, DURATION, TIMING, CONTEXT, MODIFYING FACTORS, ASSOCIATED SIGNS AND SYMPTOMS)
Patient is a 29 year old, male; domicile with parents and siblings; single; noncaregiver; employed; never received formal psychiatric treatment,  no hospitalizations; no known suicide attempts; ; no active substance abuse or known history of complicated withdrawal; h/o aggression with family and friends; PMH unremarkable; arrived by [employer] for c/o left knee pain; psychiatry consulted for paranoia.     Patient observed wearing dark sunglasses in the ED and was starting at his phone while speaking to staff. He report he came to the ED this evening because he injured his knee while playing basket ball. Patient reports his mood has been " good," and he denies any h/o depression. Patient reports he works at an [employer] and " loves his job." Patient reports his sleep and appetite have been good and he gets along well with all family members. Patient denies any symptoms of psychosis including A/V/h, thoughts of paranoia or ideas of reference. He  denies symptoms of rody including having abnormal amounts of energy in the context of not sleeping, racing thoughts or engaging in risky behavior and denies any past or recent substance abuse.     Received collateral from patients sister Judd who accompanied patient has his mother to the hospital. Patient has been increasingly paranoid and is often observed talking and laughing to self. Patient is isolative and does not appear to be going to work He is not sleeping at night and skips meals daily. Patient is frequently looking out their home windows and shutting the blinds because he fears random people are out to get him. Patient has been physically aggressive to family members and family is afraid of him At time he does not appear to make sense when he talks and other times he refuses ot speak with family. Sister feels patient is a danger to self and others and requires inpatient admission. She is not aware of any substances of abuse.    See SW note for collateral from mother. Patient is a 29 year old, male; domicile with parents and siblings; single; noncaregiver; employed; never received formal psychiatric treatment,  no hospitalizations; no known suicide attempts; ; no active substance abuse or known history of complicated withdrawal; h/o aggression with family and friends; PMH unremarkable; arrived by [employer] for c/o knee pain; psychiatry consulted for paranoia and agitation      Patient observed wearing dark sunglasses in the ED and was starting at his phone while speaking to staff. He report he came to the ED this evening because he injured his knee while playing basket ball. Patient reports his mood has been " good," and he denies any h/o depression. Patient reports he works at an [employer] and " loves his job." Patient reports his sleep and appetite have been good and he gets along well with all family members. Patient denies any symptoms of psychosis including A/V/h, thoughts of paranoia or ideas of reference. He  denies symptoms of rody including having abnormal amounts of energy in the context of not sleeping, racing thoughts or engaging in risky behavior and denies any past or recent substance abuse.     Received collateral from patients sister Judd who accompanied patient has his mother to the hospital. Patient has been increasingly paranoid and is often observed talking and laughing to self. Patient is isolative and does not appear to be going to work He is not sleeping at night and skips meals daily. Patient is frequently looking out their home windows and shutting the blinds because he fears random people are out to get him. Patient has been physically aggressive to family members and family is afraid of him At time he does not appear to make sense when he talks and other times he refuses ot speak with family. Sister feels patient is a danger to self and others and requires inpatient admission. She is not aware of any substances of abuse.    See SW note for collateral from mother.

## 2025-06-25 NOTE — ED BEHAVIORAL HEALTH ASSESSMENT NOTE - TIME CONSULT PERFORMED
25-Jun-2025 00:31 Post-Care Instructions: I reviewed with the patient in detail post-care instructions. Patient is to wear sunprotection, and avoid picking at any of the treated lesions. Pt may apply Vaseline to crusted or scabbing areas. Include Z78.9 (Other Specified Conditions Influencing Health Status) As An Associated Diagnosis?: No Medical Necessity Clause: This procedure was medically necessary because the lesions that were treated were: Anesthesia Volume In Cc: 0.5 Treatment Number (Will Not Render If 0): 0 Detail Level: Detailed Consent: The patient's consent was obtained including but not limited to risks of crusting, scabbing, blistering, scarring, darker or lighter pigmentary change, recurrence, incomplete removal and infection. Medical Necessity Information: It is in your best interest to select a reason for this procedure from the list below. All of these items fulfill various CMS LCD requirements except the new and changing color options.

## 2025-06-25 NOTE — ED BEHAVIORAL HEALTH ASSESSMENT NOTE - NSBHATTESTAPPAMEND_PSY_A_CORE
I have personally seen and examined this patient. I fully participated in the care of this patient. I have made amendments to the documentation where appropriate and otherwise agree with the history, physical exam, and plan as documented by the TIFFANIE

## 2025-06-25 NOTE — BH INPATIENT PSYCHIATRY ASSESSMENT NOTE - NSBHASSESSSUMMFT_PSY_ALL_CORE
Patient is a 29 year old single male, no dependents.  Patient domiciles in private residence with parents and siblings, currently employed.  Patient has no formal PPH. No substance use, admitting utox is negative. Patient no prior inpatient hospitalizations. Patient arrived by [employer] for c/o knee pain, he came to the ED this evening because he injured his knee while playing basket ball, xray resulted negative; psychiatry consulted for paranoia and agitation. Patient is hospitalized with a primary problem of psychotic decompensation.      Plan:  >Legal: 9.27  >Obs: Routine, no current SI. no need for CO, patient not expected to pose risk to self or others in controlled inpatient setting  >Psychiatric Meds:   -Risperdal 1mg qhs for psychosis  PRN medications:  For agitation please attempt verbal de-escalation and behavioral intervention first. If patient does not respond to above, may give recommended pp q6h prn, if no contraindications. If patient is refusing PO, remains an imminent danger to self or others and If Patient's  qtc <500, can escalate to IM formulation. If IM antipsychotic is administered, please perform follow-up ECG for QTc  Ativan 2mg oral Q6HR PRN for agitation and anxiety.  Haldol 5mg oral Q6HR PRN for agitation.   >Labs: Admission labs reviewed, no acute findings. WBC mildly elevated but pt denying constitutional sxs, U-tox negative.  Labs pending for tomorrow: A1c and Lipid panel. Hold antipsychotics if QTc >500  >Medical:   No acute concerns. No consultations needed at this time. No indication for CIWA. low current suspicion for intoxication/withdrawal. Patient with consistently stable VS, no visible physical symptoms of withdrawal.   During the course of treatment, will collaborate with medical team to manage medical issues.  >Consulte: Physical therapy  >Diet: Regular  >Social: milieu/structured therapy  >Treatment Interventions: Groups and Individual Therapy/CBT, Motivational counseling for substance abuse related issues.   >Dispo: Collateral and dispo planning pending further symptom and medication optimization   Patient is a 29 year old single male, no dependents.  Patient domiciles in private residence with parents and siblings, currently employed.  Patient has no formal PPH. No substance use, admitting utox is negative. Patient no prior inpatient hospitalizations. Patient arrived by [employer] for c/o knee pain, he came to the ED this evening because he injured his knee while playing basket ball, xray resulted negative; psychiatry consulted for paranoia and agitation. Patient is hospitalized with a primary problem of psychotic decompensation.      Plan:  >Legal: 9.27  >Obs: Routine, no current SI. no need for CO, patient not expected to pose risk to self or others in controlled inpatient setting  >Psychiatric Meds:   -Risperdal 1mg qhs for psychosis  PRN medications:  For agitation please attempt verbal de-escalation and behavioral intervention first. If patient does not respond to above, may give recommended pp q6h prn, if no contraindications. If patient is refusing PO, remains an imminent danger to self or others and If Patient's  qtc <500, can escalate to IM formulation. If IM antipsychotic is administered, please perform follow-up ECG for QTc  Ativan 2mg oral Q6HR PRN for agitation and anxiety.  Haldol 5mg oral Q6HR PRN for agitation.   >Labs: Admission labs reviewed, no acute findings. WBC mildly elevated but pt denying constitutional sxs, U-tox negative.  Labs pending for tomorrow: A1c and Lipid panel. Hold antipsychotics if QTc >500  >Medical:   No acute concerns. No consultations needed at this time. No indication for CIWA. low current suspicion for intoxication/withdrawal. Patient with consistently stable VS, no visible physical symptoms of withdrawal.   During the course of treatment, will collaborate with medical team to manage medical issues.  >Consult: Physical therapy  >Diet: Regular  >Social: milieu/structured therapy  >Treatment Interventions: Groups and Individual Therapy/CBT, Motivational counseling for substance abuse related issues.   >Dispo: Collateral and dispo planning pending further symptom and medication optimization

## 2025-06-25 NOTE — ED BEHAVIORAL HEALTH ASSESSMENT NOTE - MEDICAL RECORD REVIEWED
This 77-year-old female is status post laser ablation of her right great saphenous vein on 6/14 last week. She did real well and did not have much discomfort. We will schedule her bilateral microphlebectomies and bilateral sclerotherapy later. On ultrasound today the vein is closing off nicely right up to the junction with the femoral vein.   Yes

## 2025-06-25 NOTE — BH INPATIENT PSYCHIATRY ASSESSMENT NOTE - HPI (INCLUDE ILLNESS QUALITY, SEVERITY, DURATION, TIMING, CONTEXT, MODIFYING FACTORS, ASSOCIATED SIGNS AND SYMPTOMS)
Patient was seen and evaluated, chart, medications and labs reviewed. Case discussed with nursing team.  On service for this 29 year old single male, no dependents.  Patient domiciles in private residence with parents and siblings, currently employed.  Patient has no formal PPH. No substance use, admitting utox is negative. Patient no prior inpatient hospitalizations. Patient arrived by [employer] for c/o knee pain, he came to the ED this evening because he injured his knee while playing basket ball, xray resulted negative; psychiatry consulted for paranoia and agitation. Patient is hospitalized with a primary problem of psychotic decompensation.  Patient admitted to [facility] on a 9.27 legal status. I have reviewed the initial psychiatric assessment in the electronic medical record, including the history of present illness, past psychiatric history, family/social history (no pertinent changes), and exam, and have confirmed the salient findings dated  6/25/25.  As per chart review, transferring records indicated the following:  Patient is a 29 year old, male; domicile with parents and siblings; single; noncaregiver; employed; never received formal psychiatric treatment,  no hospitalizations; no known suicide attempts; ; no active substance abuse or known history of complicated withdrawal; h/o aggression with family and friends; PMH unremarkable; arrived by [employer] for c/o knee pain; psychiatry consulted for paranoia and agitation   Patient observed wearing dark sunglasses in the ED and was starting at his phone while speaking to staff. He report he came to the ED this evening because he injured his knee while playing basket ball. Patient reports his mood has been " good," and he denies any h/o depression. Patient reports he works at an [employer] and " loves his job." Patient reports his sleep and appetite have been good and he gets along well with all family members. Patient denies any symptoms of psychosis including A/V/h, thoughts of paranoia or ideas of reference. He  denies symptoms of rody including having abnormal amounts of energy in the context of not sleeping, racing thoughts or engaging in risky behavior and denies any past or recent substance abuse.  Received collateral from patients sister Judd who accompanied patient has his mother to the hospital. Patient has been increasingly paranoid and is often observed talking and laughing to self. Patient is isolative and does not appear to be going to work He is not sleeping at night and skips meals daily. Patient is frequently looking out their home windows and shutting the blinds because he fears random people are out to get him. Patient has been physically aggressive to family members and family is afraid of him At time he does not appear to make sense when he talks and other times he refuses ot speak with family. Sister feels patient is a danger to self and others and requires inpatient admission. She is not aware of any substances of abuse.    On ML4:   Patient was seen and evaluated, chart, medications and labs reviewed. Case discussed with nursing team.  On service for this 29 year old single male, no dependents.  Patient domiciles in private residence with parents and siblings, currently employed.  Patient has no formal PPH. No substance use, admitting utox is negative. Patient no prior inpatient hospitalizations. Patient arrived by [employer] for c/o knee pain, he came to the ED this evening because he injured his knee while playing basket ball, xray resulted negative; psychiatry consulted for paranoia and agitation. Patient is hospitalized with a primary problem of psychotic decompensation.  Patient admitted to [facility] on a 9.27 legal status. I have reviewed the initial psychiatric assessment in the electronic medical record, including the history of present illness, past psychiatric history, family/social history (no pertinent changes), and exam, and have confirmed the salient findings dated  6/25/25.  As per chart review, transferring records indicated the following:  Patient is a 29 year old, male; domicile with parents and siblings; single; noncaregiver; employed; never received formal psychiatric treatment,  no hospitalizations; no known suicide attempts; ; no active substance abuse or known history of complicated withdrawal; h/o aggression with family and friends; PMH unremarkable; arrived by [employer] for c/o knee pain; psychiatry consulted for paranoia and agitation   Patient observed wearing dark sunglasses in the ED and was starting at his phone while speaking to staff. He report he came to the ED this evening because he injured his knee while playing basket ball. Patient reports his mood has been " good," and he denies any h/o depression. 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 Patient denies any symptoms of psychosis including A/V/h, thoughts of paranoia or ideas of reference. He  denies symptoms of rody including having abnormal amounts of energy in the context of not sleeping, racing thoughts or engaging in risky behavior and denies any past or recent substance abuse.  Received collateral from patients sister Judd who accompanied patient has his mother to the hospital. Patient has been increasingly paranoid and is often observed talking and laughing to self. Patient is isolative and does not appear to be going to work He is not sleeping at night and skips meals daily. Patient is frequently looking out their home windows and shutting the blinds because he fears random people are out to get him. Patient has been physically aggressive to family members and family is afraid of him At time he does not appear to make sense when he talks and other times he refuses ot speak with family. Sister feels patient is a danger to self and others and requires inpatient admission. She is not aware of any substances of abuse.      On unit ML4: information came from chart review and patient interview  Discussed precipitant events leading  to warrant inpatient hospitalization. Patient reports "I don't know why I'm here" patient reports that he went to ED with his mother due to concerns with pain to his right knee and leg. Reports that he noticed his right knee swollen, he was having difficulty ambulating.  States he believes that he injured his leg from playing basketball.  Patient reports that he does not know why he was admitted psychiatrically.  In regards to his mood pt reports "I'm doing good, looking forward to the summer"  Patient reports that he enjoys his job working at the [employer], enjoys working out at the gym and playing sports with his friends.  He denies any mood dysregulation or anxiety. He denies any hx of or current SA/SI/SIB/HI, no plan or intent.    Patient denies  following symptoms: difficulty concentrating, perseverative thoughts, poor appetite, insomnia, feelings of sadness, anhedonia, hopelessness, worthlessness, or  feelings of restlessness. Denies feeling  irritable, poor frustration tolerance, disruption in functionality (reports he goes to work everyday works full time), denies issues with sleep or appetite.  Patient denies any hx or current AVH, delusions, psychotic disorganization, mind reading abilities, thought insertion, ideas of reference, special norris, or thought broadcasting or paranoia. Denies history of aggression or violence. No access to firearm. Patient denies any symptoms suggestive of active rody: (denied grandiosity/ racing thoughts/ increased goal directed activities or engaged in risk taking behavior/ no pressured speech/ no elevated mood/ denied any increased in energy level causing sleep disruption), no signs of catatonia (with no signs of mutism, negativism, stereotypy, echolalia, echopraxia, posturing or rigidity. He was alert and able to answer appropriately to questions during exam. He denies obsessive, intrusive and persistent thoughts or compulsive, ritualistic acts are reported. Patient denies any active legal issues, is not currently under any kind of court supervision. Reports history of DUI in 2016.   In regards to substance use, patient reports remote history of excessive alcohol use (reports he stopped drinking 2 yrs ago), reports occasional use of cannabis, but reports he has significantly reduces his usage, admitting utox is negative.  Denies any other non-substance addictive behaviors (sex addiction, gambling addiction, overspending/oniomania, compulsive overeating).   Patient  denied past  trauma. No PMH. Patient denies all admitting allegations.

## 2025-06-26 LAB
A1C WITH ESTIMATED AVERAGE GLUCOSE RESULT: 5.4 % — SIGNIFICANT CHANGE UP (ref 4–5.6)
CHOLEST SERPL-MCNC: 116 MG/DL — SIGNIFICANT CHANGE UP
ESTIMATED AVERAGE GLUCOSE: 108 — SIGNIFICANT CHANGE UP
HDLC SERPL-MCNC: 70 MG/DL — SIGNIFICANT CHANGE UP
LDLC SERPL-MCNC: 32 MG/DL — SIGNIFICANT CHANGE UP
LIPID PNL WITH DIRECT LDL SERPL: 32 MG/DL — SIGNIFICANT CHANGE UP
NONHDLC SERPL-MCNC: 46 MG/DL — SIGNIFICANT CHANGE UP
TRIGL SERPL-MCNC: 66 MG/DL — SIGNIFICANT CHANGE UP
WBC # BLD: 7.86 K/UL — SIGNIFICANT CHANGE UP (ref 3.8–10.5)
WBC # FLD AUTO: 7.86 K/UL — SIGNIFICANT CHANGE UP (ref 3.8–10.5)

## 2025-06-26 PROCEDURE — 99285 EMERGENCY DEPT VISIT HI MDM: CPT

## 2025-06-26 RX ADMIN — Medication 1 MILLIGRAM(S): at 20:40

## 2025-06-26 NOTE — BH INPATIENT PSYCHIATRY PROGRESS NOTE - PRN MEDS
MEDICATIONS  (PRN):  haloperidol     Tablet 5 milliGRAM(s) Oral every 6 hours PRN Mild-moderate agitation, secondary to psychosis, rody, or poor impulse control  haloperidol    Injectable 5 milliGRAM(s) IntraMuscular once PRN Severe agitation secondary to  neuropsychiatric disorder  LORazepam     Tablet 2 milliGRAM(s) Oral every 6 hours PRN Mild-moderate agitation secondary to psychosis, rody, or poor impulse, Mild-moderate anxiety  LORazepam   Injectable 2 milliGRAM(s) IntraMuscular once PRN Severe agitation secondary to psychosis, rody or poor impulse control. Severe anxiety

## 2025-06-26 NOTE — PHYSICAL THERAPY INITIAL EVALUATION ADULT - PLANNED THERAPY INTERVENTIONS, PT EVAL
balance training/gait training/joint mobilization/manual therapy techniques/neuromuscular re-education/postural re-education/ROM/strengthening/stretching

## 2025-06-26 NOTE — BH INPATIENT PSYCHIATRY PROGRESS NOTE - NSBHMETABOLIC_PSY_ALL_CORE_FT
BMI: BMI (kg/m2): 22.9 (06-25-25 @ 12:35)  HbA1c:   Glucose:   BP: 113/72 (06-25-25 @ 10:04) (110/60 - 129/71)Vital Signs Last 24 Hrs  T(C): 36.7 (06-25-25 @ 12:35), Max: 37.2 (06-25-25 @ 10:04)  T(F): 98.1 (06-25-25 @ 12:35), Max: 99 (06-25-25 @ 10:04)  HR: 89 (06-25-25 @ 10:04) (89 - 89)  BP: 113/72 (06-25-25 @ 10:04) (113/72 - 113/72)  BP(mean): --  RR: 16 (06-25-25 @ 12:35) (16 - 18)  SpO2: 100% (06-25-25 @ 10:04) (100% - 100%)    Orthostatic VS  06-25-25 @ 12:35  Lying BP: --/-- HR: --  Sitting BP: 122/86 HR: 87  Standing BP: 121/86 HR: 106  Site: upper left arm  Mode: electronic    Lipid Panel:  BMI: BMI (kg/m2): 22.9 (06-25-25 @ 12:35)  HbA1c: A1C with Estimated Average Glucose Result: 5.4 % (06-26-25 @ 08:58)    Glucose:   BP: 113/72 (06-25-25 @ 10:04) (110/60 - 129/71)Vital Signs Last 24 Hrs  T(C): 37.2 (06-26-25 @ 08:37), Max: 37.2 (06-26-25 @ 08:37)  T(F): 98.9 (06-26-25 @ 08:37), Max: 98.9 (06-26-25 @ 08:37)  HR: --  BP: --  BP(mean): --  RR: 18 (06-26-25 @ 07:34) (16 - 18)  SpO2: --    Orthostatic VS  06-26-25 @ 08:37  Lying BP: --/-- HR: --  Sitting BP: 115/84 HR: 97  Standing BP: 117/79 HR: 114  Site: --  Mode: electronic  Orthostatic VS  06-25-25 @ 12:35  Lying BP: --/-- HR: --  Sitting BP: 122/86 HR: 87  Standing BP: 121/86 HR: 106  Site: upper left arm  Mode: electronic    Lipid Panel: Date/Time: 06-26-25 @ 08:58  Cholesterol, Serum: 116  LDL Cholesterol Calculated: 32  HDL Cholesterol, Serum: 70  Total Cholesterol/HDL Ration Measurement: --  Triglycerides, Serum: 66

## 2025-06-26 NOTE — BH INPATIENT PSYCHIATRY PROGRESS NOTE - CURRENT MEDICATION
MEDICATIONS  (STANDING):  risperiDONE   Tablet 1 milliGRAM(s) Oral at bedtime    MEDICATIONS  (PRN):  haloperidol     Tablet 5 milliGRAM(s) Oral every 6 hours PRN Mild-moderate agitation, secondary to psychosis, rody, or poor impulse control  haloperidol    Injectable 5 milliGRAM(s) IntraMuscular once PRN Severe agitation secondary to  neuropsychiatric disorder  LORazepam     Tablet 2 milliGRAM(s) Oral every 6 hours PRN Mild-moderate agitation secondary to psychosis, rody, or poor impulse, Mild-moderate anxiety  LORazepam   Injectable 2 milliGRAM(s) IntraMuscular once PRN Severe agitation secondary to psychosis, rody or poor impulse control. Severe anxiety

## 2025-06-26 NOTE — BH INPATIENT PSYCHIATRY PROGRESS NOTE - NSBHFUPINTERVALHXFT_PSY_A_CORE
Patient is seen for psychosis.  Chart, medications and labs reviewed.  Patient is discussed with nursing team, no interval events.  Patient declined standing Risperdal hs, stating he is not here for psychiatric services "I went to ER for my leg"  Patient denies SI/HI/AVH. Denies depression or anxiety.  Patient utilizing walker to ambulate. PT consult ordered.  Patient offers no complaints.  Patient is seen for psychosis.  Chart, medications and labs reviewed.  Patient is discussed with nursing team, no interval events.  Patient declined standing Risperdal hs, stating he is not here for psychiatric services "I went to ER for my leg"  Patient is calm, cooperative, pleasant. He denies SI/HI/AVH. Denies depression or anxiety. No prominent symptoms observed.   Patient utilizing walker to ambulate. PT consult ordered.  Patient offers no complaints.

## 2025-06-26 NOTE — PSYCHIATRIC REHAB INITIAL EVALUATION - NSBHPRRECOMMEND_PSY_ALL_CORE
Patient is 29 year old male , employed at a car dealership, with no prior psychiatric tx or hospitalizations, transferred from medical after coming to hospital to have his knee looked at, and was found to be paranoid. Writer met with patient in order to orient him to the unit and introduce him to department staff and department functions. Patient was pleasant, verbal, and cooperative. Patient reported that his mother was concerned about him because she felt as though he was drinking too much. Patient admitted to drinking hard liquor in the past, but states that now he only has "an occasional glass of wine, and occasional cannabis socially". Patient denied anxiety, depression, AH/VH/SI/HI. Patient was encouraged to attend daily groups and explore effective copng skills.

## 2025-06-26 NOTE — BH SOCIAL WORK INITIAL PSYCHOSOCIAL EVALUATION - OTHER PAST PSYCHIATRIC HISTORY (INCLUDE DETAILS REGARDING ONSET, COURSE OF ILLNESS, INPATIENT/OUTPATIENT TREATMENT)
29 year old single male, no dependents.  Patient domiciles in private residence with parents and siblings, currently employed.  Patient has no formal PPH. No substance use, admitting utox is negative. Patient no prior inpatient hospitalizations. Patient arrived by [employer] for c/o knee pain, he came to the ED this evening because he injured his knee while playing basket ball, xray resulted negative; psychiatry consulted for paranoia and agitation. Patient is hospitalized with a primary problem of psychotic decompensation.  Patient admitted to [facility] on a 9.27 legal status. per clinicals pt denies trauma, abuse, psych history, and denies hx of anxiety, depression. per clinicals pt has hx of DUI in 2016 but no known current legal issues. Per clinicals pt has remote hx of alcohol abuse (stopped drinking 2 years ago) and occasional marijuana use but has significantly reduces usage (and Utox was negative).    Per ED they received collateral from patients sister Judd who accompanied patient has his mother to the hospital. Patient has been increasingly paranoid and is often observed talking and laughing to self. Patient is isolative and does not appear to be going to work He is not sleeping at night and skips meals daily. Patient is frequently looking out their home windows and shutting the blinds because he fears random people are out to get him. Patient has been physically aggressive to family members and family is afraid of him At time he does not appear to make sense when he talks and other times he refuses ot speak with family. Sister feels patient is a danger to self and others and requires inpatient admission. She is not aware of any substances of abuse.    Covering sw introduced herself to pt and explained that assigned  is out of the office today. lmsw asked pt if he would like to talk or if he has any social work related questions. pt reports he just spoke to his provider and he is "all set". lmsw asked if there is anyone pt wants lmsw or tx team to call for him. pt declined. lmsw will check in with pt again tomorrow if assigned  is out again. pt presents well kempt, appears organized and logical.

## 2025-06-26 NOTE — BH INPATIENT PSYCHIATRY PROGRESS NOTE - NSBHCHARTREVIEWVS_PSY_A_CORE FT
Vital Signs Last 24 Hrs  T(C): 36.7 (06-25-25 @ 12:35), Max: 37.2 (06-25-25 @ 10:04)  T(F): 98.1 (06-25-25 @ 12:35), Max: 99 (06-25-25 @ 10:04)  HR: 89 (06-25-25 @ 10:04) (89 - 89)  BP: 113/72 (06-25-25 @ 10:04) (113/72 - 113/72)  BP(mean): --  RR: 16 (06-25-25 @ 12:35) (16 - 18)  SpO2: 100% (06-25-25 @ 10:04) (100% - 100%)    Orthostatic VS  06-25-25 @ 12:35  Lying BP: --/-- HR: --  Sitting BP: 122/86 HR: 87  Standing BP: 121/86 HR: 106  Site: upper left arm  Mode: electronic   Vital Signs Last 24 Hrs  T(C): 37.2 (06-26-25 @ 08:37), Max: 37.2 (06-26-25 @ 08:37)  T(F): 98.9 (06-26-25 @ 08:37), Max: 98.9 (06-26-25 @ 08:37)  HR: --  BP: --  BP(mean): --  RR: 18 (06-26-25 @ 07:34) (16 - 18)  SpO2: --    Orthostatic VS  06-26-25 @ 08:37  Lying BP: --/-- HR: --  Sitting BP: 115/84 HR: 97  Standing BP: 117/79 HR: 114  Site: --  Mode: electronic  Orthostatic VS  06-25-25 @ 12:35  Lying BP: --/-- HR: --  Sitting BP: 122/86 HR: 87  Standing BP: 121/86 HR: 106  Site: upper left arm  Mode: electronic

## 2025-06-26 NOTE — PHYSICAL THERAPY INITIAL EVALUATION ADULT - PERTINENT HX OF CURRENT PROBLEM, REHAB EVAL
Patient is a 29 year old, male; domicile with parents and siblings; single; noncaregiver; employed; never received formal psychiatric treatment,  no hospitalizations; no known suicide attempts; ; no active substance abuse or known history of complicated withdrawal; h/o aggression with family and friends; PMH unremarkable; arrived by [employer] for c/o knee pain; psychiatry consulted for paranoia and agitation. Patient referred to physical therapy secondary right foot injury.

## 2025-06-27 PROCEDURE — 99233 SBSQ HOSP IP/OBS HIGH 50: CPT

## 2025-06-27 RX ADMIN — Medication 1 MILLIGRAM(S): at 21:06

## 2025-06-27 NOTE — BH INPATIENT PSYCHIATRY PROGRESS NOTE - NSBHCHARTREVIEWVS_PSY_A_CORE FT
Vital Signs Last 24 Hrs  T(C): 36.6 (06-26-25 @ 19:53), Max: 37.2 (06-26-25 @ 08:37)  T(F): 97.9 (06-26-25 @ 19:53), Max: 98.9 (06-26-25 @ 08:37)  HR: --  BP: --  BP(mean): --  RR: 18 (06-26-25 @ 19:53) (18 - 18)  SpO2: --    Orthostatic VS  06-26-25 @ 19:53  Lying BP: --/-- HR: --  Sitting BP: 117/77 HR: 88  Standing BP: 115/89 HR: 93  Site: --  Mode: --  Orthostatic VS  06-26-25 @ 08:37  Lying BP: --/-- HR: --  Sitting BP: 115/84 HR: 97  Standing BP: 117/79 HR: 114  Site: --  Mode: electronic  Orthostatic VS  06-25-25 @ 12:35  Lying BP: --/-- HR: --  Sitting BP: 122/86 HR: 87  Standing BP: 121/86 HR: 106  Site: upper left arm  Mode: electronic   Vital Signs Last 24 Hrs  T(C): 36.2 (06-27-25 @ 08:23), Max: 36.6 (06-26-25 @ 19:53)  T(F): 97.2 (06-27-25 @ 08:23), Max: 97.9 (06-26-25 @ 19:53)  HR: --  BP: --  BP(mean): --  RR: 18 (06-26-25 @ 19:53) (18 - 18)  SpO2: --    Orthostatic VS  06-27-25 @ 08:23  Lying BP: --/-- HR: --  Sitting BP: 110/69 HR: 88  Standing BP: 99/69 HR: 106  Site: --  Mode: --  Orthostatic VS  06-26-25 @ 19:53  Lying BP: --/-- HR: --  Sitting BP: 117/77 HR: 88  Standing BP: 115/89 HR: 93  Site: --  Mode: --  Orthostatic VS  06-26-25 @ 08:37  Lying BP: --/-- HR: --  Sitting BP: 115/84 HR: 97  Standing BP: 117/79 HR: 114  Site: --  Mode: electronic  Orthostatic VS  06-25-25 @ 12:35  Lying BP: --/-- HR: --  Sitting BP: 122/86 HR: 87  Standing BP: 121/86 HR: 106  Site: upper left arm  Mode: electronic

## 2025-06-27 NOTE — BH INPATIENT PSYCHIATRY PROGRESS NOTE - NSBHFUPINTERVALHXFT_PSY_A_CORE
Patient is seen for psychosis.  Chart, medications and labs reviewed.  Patient is discussed with nursing team, no interval events.  Patient declined standing Risperdal hs, stating he is not here for psychiatric services , reports he does not need psychiatric medications.   Patient is calm, cooperative, pleasant, brighter affect today. He denies SI/HI/AVH. Denies depression or anxiety. No prominent symptoms observed. No overt psychotic trend.  Patient utilizing walker to ambulate, patient reports pain to leg but declines pain medications "I don't take medications" patient reports that he is practicing the PT exercises for his leg. Remains in good behavioral control, he is seen in groups. Patient offers no complaints. Continue to monitor. Patient is seen for psychosis.  Chart, medications and labs reviewed.  Patient is discussed with nursing team, no interval events.  Patient accepted standing Risperdal hs.   Patient is calm, cooperative, pleasant, brighter affect today. He denies SI/HI/AVH. Denies depression or anxiety. Reports he had a good visit with his father last night, and is looking forward to a visit with his mother tonight.  No prominent symptoms observed. No overt psychotic trend.  Patient utilizing walker to ambulate, patient reports pain to leg but declines pain medications, patient reports that he is practicing the PT exercises for his leg. Remains in good behavioral control, he is seen in groups. Patient offers no complaints. Continue to monitor.

## 2025-06-27 NOTE — BH INPATIENT PSYCHIATRY PROGRESS NOTE - NSBHMETABOLIC_PSY_ALL_CORE_FT
BMI: BMI (kg/m2): 22.9 (06-25-25 @ 12:35)  HbA1c: A1C with Estimated Average Glucose Result: 5.4 % (06-26-25 @ 08:58)    Glucose:   BP: 113/72 (06-25-25 @ 10:04) (110/60 - 129/71)Vital Signs Last 24 Hrs  T(C): 36.6 (06-26-25 @ 19:53), Max: 37.2 (06-26-25 @ 08:37)  T(F): 97.9 (06-26-25 @ 19:53), Max: 98.9 (06-26-25 @ 08:37)  HR: --  BP: --  BP(mean): --  RR: 18 (06-26-25 @ 19:53) (18 - 18)  SpO2: --    Orthostatic VS  06-26-25 @ 19:53  Lying BP: --/-- HR: --  Sitting BP: 117/77 HR: 88  Standing BP: 115/89 HR: 93  Site: --  Mode: --  Orthostatic VS  06-26-25 @ 08:37  Lying BP: --/-- HR: --  Sitting BP: 115/84 HR: 97  Standing BP: 117/79 HR: 114  Site: --  Mode: electronic  Orthostatic VS  06-25-25 @ 12:35  Lying BP: --/-- HR: --  Sitting BP: 122/86 HR: 87  Standing BP: 121/86 HR: 106  Site: upper left arm  Mode: electronic    Lipid Panel: Date/Time: 06-26-25 @ 08:58  Cholesterol, Serum: 116  LDL Cholesterol Calculated: 32  HDL Cholesterol, Serum: 70  Total Cholesterol/HDL Ration Measurement: --  Triglycerides, Serum: 66   BMI: BMI (kg/m2): 22.9 (06-25-25 @ 12:35)  HbA1c: A1C with Estimated Average Glucose Result: 5.4 % (06-26-25 @ 08:58)    Glucose:   BP: 113/72 (06-25-25 @ 10:04) (110/60 - 129/71)Vital Signs Last 24 Hrs  T(C): 36.2 (06-27-25 @ 08:23), Max: 36.6 (06-26-25 @ 19:53)  T(F): 97.2 (06-27-25 @ 08:23), Max: 97.9 (06-26-25 @ 19:53)  HR: --  BP: --  BP(mean): --  RR: 18 (06-26-25 @ 19:53) (18 - 18)  SpO2: --    Orthostatic VS  06-27-25 @ 08:23  Lying BP: --/-- HR: --  Sitting BP: 110/69 HR: 88  Standing BP: 99/69 HR: 106  Site: --  Mode: --  Orthostatic VS  06-26-25 @ 19:53  Lying BP: --/-- HR: --  Sitting BP: 117/77 HR: 88  Standing BP: 115/89 HR: 93  Site: --  Mode: --  Orthostatic VS  06-26-25 @ 08:37  Lying BP: --/-- HR: --  Sitting BP: 115/84 HR: 97  Standing BP: 117/79 HR: 114  Site: --  Mode: electronic  Orthostatic VS  06-25-25 @ 12:35  Lying BP: --/-- HR: --  Sitting BP: 122/86 HR: 87  Standing BP: 121/86 HR: 106  Site: upper left arm  Mode: electronic    Lipid Panel: Date/Time: 06-26-25 @ 08:58  Cholesterol, Serum: 116  LDL Cholesterol Calculated: 32  HDL Cholesterol, Serum: 70  Total Cholesterol/HDL Ration Measurement: --  Triglycerides, Serum: 66

## 2025-06-28 PROCEDURE — 99233 SBSQ HOSP IP/OBS HIGH 50: CPT

## 2025-06-28 RX ADMIN — Medication 1 MILLIGRAM(S): at 20:48

## 2025-06-28 NOTE — BH INPATIENT PSYCHIATRY PROGRESS NOTE - NSBHFUPINTERVALHXFT_PSY_A_CORE
Patient was seen for psychosis.   Chart, and medication reviewed. Case discussed with nursing team.  No interval events. Patient remains compliant with standing medications, no SE reported.  No behavioral concerns, no prns. Appetite and sleep maintained.  Patient was seen on unit, ambulating with walker.  He is calm, cooperative, pleasant and amenable to interview.  Patient reports that he is doing well overall, offers no complaints. Reports that he had a nice family visit with mother last night.  Denies SI/HI, no plan or intent on unit.   Patient inquired about discharge; discussed possible discharge next week.  Reports decreased pain in  right leg . States that he is practicing PT exercises.  No overt psychotic trend. Patient denies AVH, delusions, or paranoia. No acute medical concerns. Continue to monitor.

## 2025-06-28 NOTE — BH INPATIENT PSYCHIATRY PROGRESS NOTE - NSBHCHARTREVIEWVS_PSY_A_CORE FT
Vital Signs Last 24 Hrs  T(C): 36.8 (06-27-25 @ 18:18), Max: 36.8 (06-27-25 @ 18:18)  T(F): 98.2 (06-27-25 @ 18:18), Max: 98.2 (06-27-25 @ 18:18)  HR: --  BP: --  BP(mean): --  RR: 16 (06-27-25 @ 21:18) (16 - 16)  SpO2: --    Orthostatic VS  06-27-25 @ 18:18  Lying BP: --/-- HR: --  Sitting BP: 129/84 HR: 80  Standing BP: 114/80 HR: 105  Site: --  Mode: --  Orthostatic VS  06-27-25 @ 08:23  Lying BP: --/-- HR: --  Sitting BP: 110/69 HR: 88  Standing BP: 99/69 HR: 106  Site: --  Mode: --  Orthostatic VS  06-26-25 @ 19:53  Lying BP: --/-- HR: --  Sitting BP: 117/77 HR: 88  Standing BP: 115/89 HR: 93  Site: --  Mode: --  Orthostatic VS  06-26-25 @ 08:37  Lying BP: --/-- HR: --  Sitting BP: 115/84 HR: 97  Standing BP: 117/79 HR: 114  Site: --  Mode: electronic

## 2025-06-28 NOTE — BH INPATIENT PSYCHIATRY PROGRESS NOTE - NSBHMETABOLIC_PSY_ALL_CORE_FT
BMI: BMI (kg/m2): 22.9 (06-25-25 @ 12:35)  HbA1c: A1C with Estimated Average Glucose Result: 5.4 % (06-26-25 @ 08:58)    Glucose:   BP: 113/72 (06-25-25 @ 10:04) (113/72 - 113/72)Vital Signs Last 24 Hrs  T(C): 36.8 (06-27-25 @ 18:18), Max: 36.8 (06-27-25 @ 18:18)  T(F): 98.2 (06-27-25 @ 18:18), Max: 98.2 (06-27-25 @ 18:18)  HR: --  BP: --  BP(mean): --  RR: 16 (06-27-25 @ 21:18) (16 - 16)  SpO2: --    Orthostatic VS  06-27-25 @ 18:18  Lying BP: --/-- HR: --  Sitting BP: 129/84 HR: 80  Standing BP: 114/80 HR: 105  Site: --  Mode: --  Orthostatic VS  06-27-25 @ 08:23  Lying BP: --/-- HR: --  Sitting BP: 110/69 HR: 88  Standing BP: 99/69 HR: 106  Site: --  Mode: --  Orthostatic VS  06-26-25 @ 19:53  Lying BP: --/-- HR: --  Sitting BP: 117/77 HR: 88  Standing BP: 115/89 HR: 93  Site: --  Mode: --  Orthostatic VS  06-26-25 @ 08:37  Lying BP: --/-- HR: --  Sitting BP: 115/84 HR: 97  Standing BP: 117/79 HR: 114  Site: --  Mode: electronic    Lipid Panel: Date/Time: 06-26-25 @ 08:58  Cholesterol, Serum: 116  LDL Cholesterol Calculated: 32  HDL Cholesterol, Serum: 70  Total Cholesterol/HDL Ration Measurement: --  Triglycerides, Serum: 66

## 2025-06-29 PROCEDURE — 99233 SBSQ HOSP IP/OBS HIGH 50: CPT

## 2025-06-29 RX ADMIN — Medication 1 MILLIGRAM(S): at 20:27

## 2025-06-29 NOTE — BH INPATIENT PSYCHIATRY PROGRESS NOTE - NSBHCHARTREVIEWVS_PSY_A_CORE FT
Vital Signs Last 24 Hrs  T(C): 36.1 (06-28-25 @ 19:30), Max: 36.6 (06-28-25 @ 08:32)  T(F): 96.9 (06-28-25 @ 19:30), Max: 97.8 (06-28-25 @ 08:32)  HR: --  BP: --  BP(mean): --  RR: 17 (06-28-25 @ 09:48) (17 - 17)  SpO2: --    Orthostatic VS  06-28-25 @ 19:30  Lying BP: --/-- HR: --  Sitting BP: 134/81 HR: 91  Standing BP: 127/89 HR: 99  Site: --  Mode: --  Orthostatic VS  06-28-25 @ 08:32  Lying BP: --/-- HR: --  Sitting BP: 110/75 HR: 86  Standing BP: 109/82 HR: 113  Site: upper right arm  Mode: electronic  Orthostatic VS  06-27-25 @ 18:18  Lying BP: --/-- HR: --  Sitting BP: 129/84 HR: 80  Standing BP: 114/80 HR: 105  Site: --  Mode: --

## 2025-06-29 NOTE — BH INPATIENT PSYCHIATRY PROGRESS NOTE - NSBHFUPINTERVALHXFT_PSY_A_CORE
Patient was seen for psychosis.   Chart, and medication reviewed. Case discussed with nursing team.  No interval events. Patient remains compliant with standing medications, no SE reported.  No behavioral concerns, no prns. Appetite and sleep maintained.  Patient was seen in dayroom.  He is calm, cooperative, pleasant and amenable to interview.  Patient reports that he is doing well overall, offers no complaints. Denies SI/HI, no plan or intent on unit. Denies pain in  right leg, no swelling, ambulating with walker . States that he continues to practice PT exercises.  No overt psychotic trend. Patient denies AVH, delusions, or paranoia. No acute medical concerns. He offers no complaints. Continue to monitor.

## 2025-06-29 NOTE — BH INPATIENT PSYCHIATRY PROGRESS NOTE - NSBHMETABOLIC_PSY_ALL_CORE_FT
BMI: BMI (kg/m2): 22.3 (06-28-25 @ 16:43)  HbA1c: A1C with Estimated Average Glucose Result: 5.4 % (06-26-25 @ 08:58)    Glucose:   BP: --Vital Signs Last 24 Hrs  T(C): 36.1 (06-28-25 @ 19:30), Max: 36.6 (06-28-25 @ 08:32)  T(F): 96.9 (06-28-25 @ 19:30), Max: 97.8 (06-28-25 @ 08:32)  HR: --  BP: --  BP(mean): --  RR: 17 (06-28-25 @ 09:48) (17 - 17)  SpO2: --    Orthostatic VS  06-28-25 @ 19:30  Lying BP: --/-- HR: --  Sitting BP: 134/81 HR: 91  Standing BP: 127/89 HR: 99  Site: --  Mode: --  Orthostatic VS  06-28-25 @ 08:32  Lying BP: --/-- HR: --  Sitting BP: 110/75 HR: 86  Standing BP: 109/82 HR: 113  Site: upper right arm  Mode: electronic  Orthostatic VS  06-27-25 @ 18:18  Lying BP: --/-- HR: --  Sitting BP: 129/84 HR: 80  Standing BP: 114/80 HR: 105  Site: --  Mode: --    Lipid Panel: Date/Time: 06-26-25 @ 08:58  Cholesterol, Serum: 116  LDL Cholesterol Calculated: 32  HDL Cholesterol, Serum: 70  Total Cholesterol/HDL Ration Measurement: --  Triglycerides, Serum: 66

## 2025-06-30 PROCEDURE — 99238 HOSP IP/OBS DSCHRG MGMT 30/<: CPT

## 2025-06-30 RX ORDER — RISPERIDONE 4 MG
2 TABLET ORAL AT BEDTIME
Refills: 0 | Status: DISCONTINUED | OUTPATIENT
Start: 2025-06-30 | End: 2025-07-03

## 2025-06-30 RX ADMIN — Medication 2 MILLIGRAM(S): at 21:25

## 2025-06-30 NOTE — BH INPATIENT PSYCHIATRY DISCHARGE NOTE - NSDCRECOMMENDMEDICALFT_PSY_ALL_CORE
Upon discharge, encouraged routine follow up for ongoing monitoring and medical management.  Recommend physical exam including EKG and metabolic screen with PCP on ongoing basis for preventative care and to maintain health and wellness.

## 2025-06-30 NOTE — BH INPATIENT PSYCHIATRY DISCHARGE NOTE - NSBHMSEINSIGHT_PSY_A_CORE
Palliative Care follow-up  PC RN met with Kelby at the bedside. Introduced self and role of PC, discussed his understanding of pt's clinical status, Kelby verbalizes that pt has liver and kidney failure. Kelby states that his and pt's goals are for her to get stronger, PT/OT if needed, to return home. PC RN discussed quality versus quantity of life, as per Kelby pt has liver failure and reacquires HD. Discussed Kelby's knowledge of prognosis and outcomes, Kelby states that he has suspected pt being closer to end-of-life. Pt has been telling Kelby to pack up her things and take her home. Kelby understands that pt might need further assistance in a SNF, need for long term HD or possible decline in status. PC RN discussed hospice in detail, just so Kelby and pt understanding options for pt. Kelby feels that they would lean toward hospice, however wants to talk with MD first before making decision, PC RN agrees. Provided contact card and encouraged Kelby to call with any questions or concerns.       Updated:   SW    Plan:   Continue to support pt and family, they will discuss POC after they speak with MD about prognosis.     Thank you for allowing Palliative Care to participate in this patient's care. Please feel free to call [phone] with any questions or concerns.   Fair

## 2025-06-30 NOTE — BH INPATIENT PSYCHIATRY DISCHARGE NOTE - NSBHASSESSSUMMFT_PSY_ALL_CORE
Patient seen individually for discharge day management. The patient's case has been reviewed with the treatment team.  Spent performing discharge risk assessment, safety planning, performing clinical eval, planning for dc and providing psychoeducation about meds, sx and aftercare. On exam today the patient was cooperative and calm. Patient’s symptoms of depressive process have lessened and they report feeling better.  Sleep and appetite maintained.   Patient has been in behavioral control no prn’s required during their hospitalization. Patient denies suicidal thoughts no plan or intent. Denies HI/AVH, delusions, or other symptoms of psychotic process are reported at this time.  showed improvement in symptoms, with a gradual reduction of their anxiety, depression.  Patient completed benzo taper, denies any withdrawal symptoms.   The patient was provided with motivational counseling to  tackle stressors and enhance coping skills,  they are encouraged to abstain from illicit substances, as these can directly worsen their mood and symptoms. Has remained compliant with medications, no adverse effects noted. They have remained in good behavioral control and has not required emergent intramuscular medications or seclusion / restraints.  denied any suicidal or homicidal ideations throughout hospitalization. Improved level of functioning is evident, with appropriate interaction with others, with manageable level of anxiety.   Patient able to identify protective factors: being with family, patient is future-oriented, has strong social and family support.  Patient no longer requires inpatient treatment and care. Patient is not judged to be an acute danger to self or others at this time. Appears ready and stable to be discharged to lower level of care.  There are no other acute risk factors that could be mitigated by further inpatient hospitalization.   Patient will be discharged today to home and outpatient follow up.   Patient left under no distress  Discharge Progress Note Date and Time: 07-03-25 @ 07:40    Patient seen individually for discharge day management. The patient's case has been reviewed with the treatment team.  Spent performing discharge risk assessment, safety planning, performing clinical eval, planning for dc and providing psychoeducation about meds, sx and aftercare. On exam today the patient was cooperative and calm. Patient’s symptoms of depressive process have lessened and they report feeling better.  Sleep and appetite maintained.   Patient has been in behavioral control no prn’s required during their hospitalization. Patient denies suicidal thoughts no plan or intent. Denies HI/AVH, delusions, or other symptoms of psychotic process are reported at this time.  showed improvement in symptoms, with a gradual reduction of their anxiety, depression.  Patient completed benzo taper, denies any withdrawal symptoms.   The patient was provided with motivational counseling to  tackle stressors and enhance coping skills,  they are encouraged to abstain from illicit substances, as these can directly worsen their mood and symptoms. Has remained compliant with medications, no adverse effects noted. They have remained in good behavioral control and has not required emergent intramuscular medications or seclusion / restraints.  denied any suicidal or homicidal ideations throughout hospitalization. Improved level of functioning is evident, with appropriate interaction with others, with manageable level of anxiety.   Patient able to identify protective factors: being with family, patient is future-oriented, has strong social and family support.  Patient no longer requires inpatient treatment and care. Patient is not judged to be an acute danger to self or others at this time. Appears ready and stable to be discharged to lower level of care.  There are no other acute risk factors that could be mitigated by further inpatient hospitalization.   Patient will be discharged today to home and outpatient follow up.   Patient left under no distress

## 2025-06-30 NOTE — BH INPATIENT PSYCHIATRY DISCHARGE NOTE - NSBHDCRISKMITIGATEFT_PSY_ALL_CORE
Symptom Stabilization  CGI<=3  Outpatient services f/u, outpatient ECT  Medication optimization  Medical stabilization

## 2025-06-30 NOTE — BH INPATIENT PSYCHIATRY PROGRESS NOTE - NSBHFUPINTERVALHXFT_PSY_A_CORE
Patient was seen for psychosis.   Chart, and medication reviewed. Case discussed with nursing team.  No weekend events. Patient remains compliant with standing medications, no SE reported.  Titrate Risperdal 2mg qhs,  No behavioral concerns, no prns. Appetite and sleep maintained.  Patient was seen on unit.  He is calm, cooperative, pleasant and amenable to interview.  Patient reports that he is doing well offers no complaints. Denies SI/HI, no plan or intent on unit. Denies pain in  right leg, no swelling, ambulating with walker . States that he continues to practice PT exercises.  No overt psychotic trend. Patient denies AVH, delusions, or paranoia. No acute medical concerns. He offers no complaints. Discussed readiness for dc, pt reports feeling stable for dc this week.  Discussed dispo plans with SW. Continue to monitor.      Patient was seen for psychosis.   Chart, and medication reviewed. Case discussed with nursing team.  No weekend events. Patient remains compliant with standing medications, no SE reported.  Titrate Risperdal 2mg qhs,  No behavioral concerns, no prns. Appetite and sleep maintained.  Patient was seen on unit.  He is calm, cooperative, pleasant and amenable to interview.  Patient reports that he is doing well offers no complaints. Denies SI/HI, no plan or intent on unit. Pt reports "I'm in a happy space"  Reports past issues with alcohol, states he is not interested in drinking anymore.  Medication teaching provided regarding Naltrexone for AUD. Risk/benefits discussed, however patient declined.  Denies pain in  right leg, no swelling, ambulating with walker . States that he continues to practice PT exercises.  No overt psychotic trend. Patient denies AVH, delusions, or paranoia. No acute medical concerns. He offers no complaints. Discussed readiness for dc, pt reports feeling stable for dc this week.  Discussed dispo plans with SW. Continue to monitor. VSS 98/77, 91, 98.3

## 2025-06-30 NOTE — BH INPATIENT PSYCHIATRY DISCHARGE NOTE - ATTENDING DISCHARGE PHYSICAL EXAMINATION:
I have personally seen and evaluated patient prior to discharge. Chart reviewed and discharge plan discussed with the psych NP as follows. Pt prior to discharge was calm, cooperative, friendly and smiling. Pt interacting well with others. No recent behavioral problems and no episodes of aggressive or dangerous behavior. No problems with sleep, appetite or energy level. Denied any active and current manic, hypomanic, depressive, psychotic or anxiety symptoms. Denied any current SI/HI/AH/VH/PI. No overt delusions.  Patient is more organized and commits to safety and to ongoing outpatient treatment.  Pt asks relevant questions about his discharge plan and about the medication regimen. Pt articulate a plan for living life after discharge. Medication risks/benefits/side effects were discussed with the patient.  Patient expressed understanding and agreed with the treatment plan. Further, instructed the patient to seek help immediately by dialing "911" or by going to the nearest ER if symptoms worsen.  Pt does not pose an acute elevated risk of imminent danger to harm to self or others. Does not require further inpatient psychiatric admission at this time. Psychiatrically cleared for discharge.  Pt urged to avoid using any alcohol or street drugs, particularly marijuana & K2, cocaine and methamphetamine (crystal meth) once discharged.  Safety plan filled out by patient and submitted into chart.

## 2025-06-30 NOTE — BH INPATIENT PSYCHIATRY DISCHARGE NOTE - NSBHDCMEDICALFT_PSY_A_CORE
No pertinent medical issues.  At the time of this treatment summary, the patient has not had any acute medical changes during his hospitalization. Pt. is medically stable. There have been no medical consultations. Patient was discharge with COVID 19 negative results. No cough, SOB, CP, or fever at time of discharge. Vitals WNL.

## 2025-06-30 NOTE — BH INPATIENT PSYCHIATRY PROGRESS NOTE - PRN MEDS
MEDICATIONS  (PRN):  haloperidol     Tablet 5 milliGRAM(s) Oral every 6 hours PRN Mild-moderate agitation, secondary to psychosis, rody, or poor impulse control  haloperidol    Injectable 5 milliGRAM(s) IntraMuscular once PRN Severe agitation secondary to  neuropsychiatric disorder  LORazepam     Tablet 2 milliGRAM(s) Oral every 6 hours PRN Mild-moderate agitation secondary to psychosis, rody, or poor impulse, Mild-moderate anxiety  LORazepam   Injectable 2 milliGRAM(s) IntraMuscular once PRN Severe agitation secondary to psychosis, rody or poor impulse control. Severe anxiety   MEDICATIONS  (PRN):  haloperidol     Tablet 5 milliGRAM(s) Oral every 6 hours PRN Mild-moderate agitation, secondary to psychosis, rody, or poor impulse control  haloperidol    Injectable 5 milliGRAM(s) IntraMuscular once PRN Severe agitation secondary to  neuropsychiatric disorder  LORazepam     Tablet 2 milliGRAM(s) Oral every 8 hours PRN Anxiety  LORazepam   Injectable 2 milliGRAM(s) IntraMuscular once PRN Severe Aggression

## 2025-06-30 NOTE — BH INPATIENT PSYCHIATRY DISCHARGE NOTE - HOSPITAL COURSE
Initiated medications: titrated to Risperdal 2mg qhs for psychosis.  Patient had no reported/observed adverse effects, such as akathisia, tremor, EPS.  AIMS score 0. Patient was educated about side effects of his medications including EPS, TD, and metabolic syndrome and conduction abnormalities.     Patient's symptoms gradually improved over the course of the hospitalization with Risperdal trial. There were no behavioral problems on the unit, no emergent intramuscular medications and no prns for aggression. Patient received PT therapy during course of hospital stay.   Patient's sleep and appetite improved.    Patient mood and anxiety improved, he denied all suicidal and aggressive ideation, no intent and plan. Denied SI/SIB/HI/AVH. Patient attended group therapy and participated well.  He was in behavioral control, socialized well with peers and staff, participated with activities on the milieu.       Patient was provided with emergency phone numbers and were instructed to call 911 or go to the nearest ER for worsening of symptoms, dangerousness to self/others.  Extensive psychoeducation provided on substance use and mental illness. rescue Narcan kit offered at [place]. Patient provided extensive psychoeducation provided on need to keep up with outpatient follow up.  The patient has a low acute risk and chronic risk of self-harm. Protective factors include no SI/HI, no SIB, no current mood sx, no hopelessness, no access to firearms.  Chronic risk factors include chronic course of presenting illness. Immediate risk was minimized by inpatient admission to a safe environment with appropriate supervision and limited access to lethal means. Future risk was minimized before discharge  providing relevant patient education, discussing emergency procedures, and ensuring close follow-up. Pt urged to avoid using any alcohol or street drugs, particularly marijuana & K2, cocaine and methamphetamine (crystal meth) once discharged. Safety plan filled out by patient and submitted into chart. From the medical standpoint patient presented with no medical problems. On admission patient was not identified as a smoker. On dc , the patient was provided with basic information about smoking cessation. negative effects of substance misuse.  In addition, patient education was reinforced on risks associated with substance use, including overdose, coma and death. Patient offered emergency Narcan Kit upon dc.      The patient remains at a low acute risk of self-harm, and such risk cannot be further ameliorated by continued inpatient treatment and the patient is therefore appropriate for discharge.  Patient labs were all WNL. Labs include---CBC/Chemistry/LFT/A1C/Lipid. Panel/TSH/CPK/HBA1-C/U-A/U-Tox/EKG/COVID  EKG: NSR   QTC: 413  Covid at discharge: nondetectable  Medical Issues: Consulted with medicine  Imaging:   CT Head IMPRESSION: No acute intracranial finding.   Xray Right foot/knee/tibia: IMPRESSION: No acute displaced fracture or dislocation. Moderate right knee joint effusion, unclear etiology.    PROCEDURES AND TREATMENT:  >Individual psychotherapy/CBT modality and group therapy  >Milieu therapy and supportive therapy.  >Psychopharmacologic management.  >Motivational counseling. Patient was evaluated and counseled by substance abuse addiction team   Discharge Pan:  -Patient given 4 weeks supply of medications. Risk, benefits and alternatives discussed with patient. Patient verbalized understanding and in accord with aftercare recommendations.   -Patient instructed to call 911 or go to nearest ER in case of emergency.   -Patient given Suicide Prevention Lifeline number 4-471-536-9597 and provided instructions on its use  -Patient will follow up with outpatient appointments at hospitals

## 2025-06-30 NOTE — BH INPATIENT PSYCHIATRY PROGRESS NOTE - CURRENT MEDICATION
MEDICATIONS  (STANDING):  risperiDONE   Tablet 2 milliGRAM(s) Oral at bedtime    MEDICATIONS  (PRN):  haloperidol     Tablet 5 milliGRAM(s) Oral every 6 hours PRN Mild-moderate agitation, secondary to psychosis, rody, or poor impulse control  haloperidol    Injectable 5 milliGRAM(s) IntraMuscular once PRN Severe agitation secondary to  neuropsychiatric disorder  LORazepam     Tablet 2 milliGRAM(s) Oral every 6 hours PRN Mild-moderate agitation secondary to psychosis, rody, or poor impulse, Mild-moderate anxiety  LORazepam   Injectable 2 milliGRAM(s) IntraMuscular once PRN Severe agitation secondary to psychosis, rody or poor impulse control. Severe anxiety   MEDICATIONS  (STANDING):  risperiDONE   Tablet 2 milliGRAM(s) Oral at bedtime    MEDICATIONS  (PRN):  haloperidol     Tablet 5 milliGRAM(s) Oral every 6 hours PRN Mild-moderate agitation, secondary to psychosis, rody, or poor impulse control  haloperidol    Injectable 5 milliGRAM(s) IntraMuscular once PRN Severe agitation secondary to  neuropsychiatric disorder  LORazepam     Tablet 2 milliGRAM(s) Oral every 8 hours PRN Anxiety  LORazepam   Injectable 2 milliGRAM(s) IntraMuscular once PRN Severe Aggression

## 2025-06-30 NOTE — BH INPATIENT PSYCHIATRY DISCHARGE NOTE - REASON FOR ADMISSION
Patient is a 29 year old single male, domiciles in private residence with parents and siblings, currently employed.  Patient has no formal PPH. No substance use, admitting utox is negative. Patient arrived to ED by Uber for c/o knee pain, he went to the ED  because he injured his knee while playing basket ball, xray resulted negative; psychiatry consulted for paranoia and agitation. Patient is hospitalized with a primary problem of psychotic decompensation.  Patient admitted to [facility] on a 9.27 legal status.

## 2025-06-30 NOTE — BH INPATIENT PSYCHIATRY PROGRESS NOTE - NSBHMETABOLIC_PSY_ALL_CORE_FT
BMI: BMI (kg/m2): 22.3 (06-28-25 @ 16:43)  HbA1c: A1C with Estimated Average Glucose Result: 5.4 % (06-26-25 @ 08:58)    Glucose:   BP: --Vital Signs Last 24 Hrs  T(C): 36.7 (06-29-25 @ 19:56), Max: 36.7 (06-29-25 @ 08:35)  T(F): 98.1 (06-29-25 @ 19:56), Max: 98.1 (06-29-25 @ 08:35)  HR: --  BP: --  BP(mean): --  RR: 16 (06-29-25 @ 19:56) (16 - 16)  SpO2: --    Orthostatic VS  06-29-25 @ 19:56  Lying BP: --/-- HR: --  Sitting BP: 125/77 HR: 92  Standing BP: 120/85 HR: 106  Site: --  Mode: --  Orthostatic VS  06-29-25 @ 08:35  Lying BP: --/-- HR: --  Sitting BP: 122/79 HR: 91  Standing BP: 112/76 HR: 109  Site: upper left arm  Mode: electronic  Orthostatic VS  06-28-25 @ 19:30  Lying BP: --/-- HR: --  Sitting BP: 134/81 HR: 91  Standing BP: 127/89 HR: 99  Site: --  Mode: --  Orthostatic VS  06-28-25 @ 08:32  Lying BP: --/-- HR: --  Sitting BP: 110/75 HR: 86  Standing BP: 109/82 HR: 113  Site: upper right arm  Mode: electronic    Lipid Panel: Date/Time: 06-26-25 @ 08:58  Cholesterol, Serum: 116  LDL Cholesterol Calculated: 32  HDL Cholesterol, Serum: 70  Total Cholesterol/HDL Ration Measurement: --  Triglycerides, Serum: 66   BMI: BMI (kg/m2): 22.3 (06-28-25 @ 16:43)  HbA1c: A1C with Estimated Average Glucose Result: 5.4 % (06-26-25 @ 08:58)    Glucose:   BP: --Vital Signs Last 24 Hrs  T(C): 36.8 (06-30-25 @ 08:25), Max: 36.8 (06-30-25 @ 08:25)  T(F): 98.3 (06-30-25 @ 08:25), Max: 98.3 (06-30-25 @ 08:25)  HR: --  BP: --  BP(mean): --  RR: 16 (06-29-25 @ 19:56) (16 - 16)  SpO2: --    Orthostatic VS  06-30-25 @ 08:25  Lying BP: --/-- HR: --  Sitting BP: 98/77 HR: 91  Standing BP: 129/77 HR: 100  Site: --  Mode: --  Orthostatic VS  06-29-25 @ 19:56  Lying BP: --/-- HR: --  Sitting BP: 125/77 HR: 92  Standing BP: 120/85 HR: 106  Site: --  Mode: --  Orthostatic VS  06-29-25 @ 08:35  Lying BP: --/-- HR: --  Sitting BP: 122/79 HR: 91  Standing BP: 112/76 HR: 109  Site: upper left arm  Mode: electronic  Orthostatic VS  06-28-25 @ 19:30  Lying BP: --/-- HR: --  Sitting BP: 134/81 HR: 91  Standing BP: 127/89 HR: 99  Site: --  Mode: --    Lipid Panel: Date/Time: 06-26-25 @ 08:58  Cholesterol, Serum: 116  LDL Cholesterol Calculated: 32  HDL Cholesterol, Serum: 70  Total Cholesterol/HDL Ration Measurement: --  Triglycerides, Serum: 66   BMI: BMI (kg/m2): 22.3 (06-28-25 @ 16:43)  HbA1c: A1C with Estimated Average Glucose Result: 5.4 % (06-26-25 @ 08:58)    Glucose:   BP: --Vital Signs Last 24 Hrs  T(C): 36.5 (07-01-25 @ 08:37), Max: 36.7 (06-30-25 @ 19:15)  T(F): 97.7 (07-01-25 @ 08:37), Max: 98.1 (06-30-25 @ 19:15)  HR: --  BP: --  BP(mean): --  RR: 16 (07-01-25 @ 08:37) (16 - 16)  SpO2: --    Orthostatic VS  07-01-25 @ 08:37  Lying BP: --/-- HR: --  Sitting BP: 120/70 HR: 99  Standing BP: 112/79 HR: 118  Site: --  Mode: electronic  Orthostatic VS  06-30-25 @ 19:15  Lying BP: --/-- HR: --  Sitting BP: 124/97 HR: 90  Standing BP: 124/91 HR: 101  Site: --  Mode: --  Orthostatic VS  06-30-25 @ 08:25  Lying BP: --/-- HR: --  Sitting BP: 98/77 HR: 91  Standing BP: 129/77 HR: 100  Site: --  Mode: --  Orthostatic VS  06-29-25 @ 19:56  Lying BP: --/-- HR: --  Sitting BP: 125/77 HR: 92  Standing BP: 120/85 HR: 106  Site: --  Mode: --    Lipid Panel: Date/Time: 06-26-25 @ 08:58  Cholesterol, Serum: 116  LDL Cholesterol Calculated: 32  HDL Cholesterol, Serum: 70  Total Cholesterol/HDL Ration Measurement: --  Triglycerides, Serum: 66

## 2025-06-30 NOTE — BH INPATIENT PSYCHIATRY PROGRESS NOTE - NSBHCHARTREVIEWVS_PSY_A_CORE FT
Vital Signs Last 24 Hrs  T(C): 36.7 (06-29-25 @ 19:56), Max: 36.7 (06-29-25 @ 08:35)  T(F): 98.1 (06-29-25 @ 19:56), Max: 98.1 (06-29-25 @ 08:35)  HR: --  BP: --  BP(mean): --  RR: 16 (06-29-25 @ 19:56) (16 - 16)  SpO2: --    Orthostatic VS  06-29-25 @ 19:56  Lying BP: --/-- HR: --  Sitting BP: 125/77 HR: 92  Standing BP: 120/85 HR: 106  Site: --  Mode: --  Orthostatic VS  06-29-25 @ 08:35  Lying BP: --/-- HR: --  Sitting BP: 122/79 HR: 91  Standing BP: 112/76 HR: 109  Site: upper left arm  Mode: electronic  Orthostatic VS  06-28-25 @ 19:30  Lying BP: --/-- HR: --  Sitting BP: 134/81 HR: 91  Standing BP: 127/89 HR: 99  Site: --  Mode: --  Orthostatic VS  06-28-25 @ 08:32  Lying BP: --/-- HR: --  Sitting BP: 110/75 HR: 86  Standing BP: 109/82 HR: 113  Site: upper right arm  Mode: electronic   Vital Signs Last 24 Hrs  T(C): 36.8 (06-30-25 @ 08:25), Max: 36.8 (06-30-25 @ 08:25)  T(F): 98.3 (06-30-25 @ 08:25), Max: 98.3 (06-30-25 @ 08:25)  HR: --  BP: --  BP(mean): --  RR: 16 (06-29-25 @ 19:56) (16 - 16)  SpO2: --    Orthostatic VS  06-30-25 @ 08:25  Lying BP: --/-- HR: --  Sitting BP: 98/77 HR: 91  Standing BP: 129/77 HR: 100  Site: --  Mode: --  Orthostatic VS  06-29-25 @ 19:56  Lying BP: --/-- HR: --  Sitting BP: 125/77 HR: 92  Standing BP: 120/85 HR: 106  Site: --  Mode: --  Orthostatic VS  06-29-25 @ 08:35  Lying BP: --/-- HR: --  Sitting BP: 122/79 HR: 91  Standing BP: 112/76 HR: 109  Site: upper left arm  Mode: electronic  Orthostatic VS  06-28-25 @ 19:30  Lying BP: --/-- HR: --  Sitting BP: 134/81 HR: 91  Standing BP: 127/89 HR: 99  Site: --  Mode: --   Vital Signs Last 24 Hrs  T(C): 36.5 (07-01-25 @ 08:37), Max: 36.7 (06-30-25 @ 19:15)  T(F): 97.7 (07-01-25 @ 08:37), Max: 98.1 (06-30-25 @ 19:15)  HR: --  BP: --  BP(mean): --  RR: 16 (07-01-25 @ 08:37) (16 - 16)  SpO2: --    Orthostatic VS  07-01-25 @ 08:37  Lying BP: --/-- HR: --  Sitting BP: 120/70 HR: 99  Standing BP: 112/79 HR: 118  Site: --  Mode: electronic  Orthostatic VS  06-30-25 @ 19:15  Lying BP: --/-- HR: --  Sitting BP: 124/97 HR: 90  Standing BP: 124/91 HR: 101  Site: --  Mode: --  Orthostatic VS  06-30-25 @ 08:25  Lying BP: --/-- HR: --  Sitting BP: 98/77 HR: 91  Standing BP: 129/77 HR: 100  Site: --  Mode: --  Orthostatic VS  06-29-25 @ 19:56  Lying BP: --/-- HR: --  Sitting BP: 125/77 HR: 92  Standing BP: 120/85 HR: 106  Site: --  Mode: --

## 2025-06-30 NOTE — BH INPATIENT PSYCHIATRY DISCHARGE NOTE - HPI (INCLUDE ILLNESS QUALITY, SEVERITY, DURATION, TIMING, CONTEXT, MODIFYING FACTORS, ASSOCIATED SIGNS AND SYMPTOMS)
Patient was seen and evaluated, chart, medications and labs reviewed. Case discussed with nursing team.  On service for this 29 year old single male, no dependents.  Patient domiciles in private residence with parents and siblings, currently employed.  Patient has no formal PPH. No substance use, admitting utox is negative. Patient no prior inpatient hospitalizations. Patient arrived by [employer] for c/o knee pain, he came to the ED this evening because he injured his knee while playing basket ball, xray resulted negative; psychiatry consulted for paranoia and agitation. Patient is hospitalized with a primary problem of psychotic decompensation.  Patient admitted to [facility] on a 9.27 legal status. I have reviewed the initial psychiatric assessment in the electronic medical record, including the history of present illness, past psychiatric history, family/social history (no pertinent changes), and exam, and have confirmed the salient findings dated  6/25/25.  As per chart review, transferring records indicated the following:  Patient is a 29 year old, male; domicile with parents and siblings; single; noncaregiver; employed; never received formal psychiatric treatment,  no hospitalizations; no known suicide attempts; ; no active substance abuse or known history of complicated withdrawal; h/o aggression with family and friends; PMH unremarkable; arrived by [employer] for c/o knee pain; psychiatry consulted for paranoia and agitation   Patient observed wearing dark sunglasses in the ED and was starting at his phone while speaking to staff. He report he came to the ED this evening because he injured his knee while playing basket ball. Patient reports his mood has been " good," and he denies any h/o depression. Patient reports he works at an [employer] and " loves his job." Patient reports his sleep and appetite have been good and he gets along well with all family members. Patient denies any symptoms of psychosis including A/V/h, thoughts of paranoia or ideas of reference. He  denies symptoms of rody including having abnormal amounts of energy in the context of not sleeping, racing thoughts or engaging in risky behavior and denies any past or recent substance abuse.  Received collateral from patients sister Judd who accompanied patient has his mother to the hospital. Patient has been increasingly paranoid and is often observed talking and laughing to self. Patient is isolative and does not appear to be going to work He is not sleeping at night and skips meals daily. Patient is frequently looking out their home windows and shutting the blinds because he fears random people are out to get him. Patient has been physically aggressive to family members and family is afraid of him At time he does not appear to make sense when he talks and other times he refuses ot speak with family. Sister feels patient is a danger to self and others and requires inpatient admission. She is not aware of any substances of abuse.      On unit ML4: information came from chart review and patient interview  Discussed precipitant events leading  to warrant inpatient hospitalization. Patient reports "I don't know why I'm here" patient reports that he went to ED with his mother due to concerns with pain to his right knee and leg. Reports that he noticed his right knee swollen, he was having difficulty ambulating.  States he believes that he injured his leg from playing basketball.  Patient reports that he does not know why he was admitted psychiatrically.  In regards to his mood pt reports "I'm doing good, looking forward to the summer"  Patient reports that he enjoys his job working at the [employer], enjoys working out at the gym and playing sports with his friends.  He denies any mood dysregulation or anxiety. He denies any hx of or current SA/SI/SIB/HI, no plan or intent.    Patient denies  following symptoms: difficulty concentrating, perseverative thoughts, poor appetite, insomnia, feelings of sadness, anhedonia, hopelessness, worthlessness, or  feelings of restlessness. Denies feeling  irritable, poor frustration tolerance, disruption in functionality (reports he goes to work everyday works full time), denies issues with sleep or appetite.  Patient denies any hx or current AVH, delusions, psychotic disorganization, mind reading abilities, thought insertion, ideas of reference, special norris, or thought broadcasting or paranoia. Denies history of aggression or violence. No access to firearm. Patient denies any symptoms suggestive of active rody: (denied grandiosity/ racing thoughts/ increased goal directed activities or engaged in risk taking behavior/ no pressured speech/ no elevated mood/ denied any increased in energy level causing sleep disruption), no signs of catatonia (with no signs of mutism, negativism, stereotypy, echolalia, echopraxia, posturing or rigidity. He was alert and able to answer appropriately to questions during exam. He denies obsessive, intrusive and persistent thoughts or compulsive, ritualistic acts are reported. Patient denies any active legal issues, is not currently under any kind of court supervision. Reports history of DUI in 2016.   In regards to substance use, patient reports remote history of excessive alcohol use (reports he stopped drinking 2 yrs ago), reports occasional use of cannabis, but reports he has significantly reduces his usage, admitting utox is negative.  Denies any other non-substance addictive behaviors (sex addiction, gambling addiction, overspending/oniomania, compulsive overeating).   Patient  denied past  trauma. No PMH. Patient denies all admitting allegations.

## 2025-06-30 NOTE — BH INPATIENT PSYCHIATRY DISCHARGE NOTE - NSBHDCHANDOFFFT_PSY_ALL_CORE
Medication reviewed, follow up care provided to patient, and safety plan reviewed with patient Medication list and discharge summary included discussion of hospital course, treatment, and medications, with phone number left for call back provided to outpatient Psychiatrist at …  Writer's contact information was provided for any further questions or concerns to contact Lizy Reyes NP at [phone].  Medication reviewed, follow up care provided to patient, and safety plan reviewed with patient Medication list and discharge summary included discussion of hospital course, treatment, and medications, with phone number left for call back provided to outpatient clinic at [facility].   Writer's contact information was provided for any further questions or concerns to contact Lizy Reyes NP at [phone].
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
## 2025-06-30 NOTE — BH INPATIENT PSYCHIATRY PROGRESS NOTE - NSBHATTESTBILLING_PSY_A_CORE
Quality 110: Preventive Care And Screening: Influenza Immunization: Influenza Immunization Administered during Influenza season Quality 226: Preventive Care And Screening: Tobacco Use: Screening And Cessation Intervention: Patient screened for tobacco use and is an ex/non-smoker Quality 111:Pneumonia Vaccination Status For Older Adults: Patient received any pneumococcal conjugate or polysaccharide vaccine on or after their 60th birthday and before the end of the measurement period Quality 130: Documentation Of Current Medications In The Medical Record: Current Medications Documented Detail Level: Detailed 99285-Emergency department visit - high complexity 71988-Dvpbvyilhu OBS or IP - moderate complexity OR 35-49 mins

## 2025-07-01 PROCEDURE — 99232 SBSQ HOSP IP/OBS MODERATE 35: CPT

## 2025-07-01 RX ORDER — LORAZEPAM 4 MG/ML
2 VIAL (ML) INJECTION ONCE
Refills: 0 | Status: DISCONTINUED | OUTPATIENT
Start: 2025-07-01 | End: 2025-07-03

## 2025-07-01 RX ORDER — LORAZEPAM 4 MG/ML
2 VIAL (ML) INJECTION EVERY 8 HOURS
Refills: 0 | Status: DISCONTINUED | OUTPATIENT
Start: 2025-07-01 | End: 2025-07-03

## 2025-07-01 RX ORDER — RISPERIDONE 4 MG
1 TABLET ORAL
Qty: 30 | Refills: 0
Start: 2025-07-01 | End: 2025-07-30

## 2025-07-01 RX ADMIN — Medication 2 MILLIGRAM(S): at 20:18

## 2025-07-01 NOTE — BH INPATIENT PSYCHIATRY PROGRESS NOTE - NSBHMETABOLIC_PSY_ALL_CORE_FT
BMI: BMI (kg/m2): 22.3 (06-28-25 @ 16:43)  HbA1c: A1C with Estimated Average Glucose Result: 5.4 % (06-26-25 @ 08:58)    Glucose:   BP: --Vital Signs Last 24 Hrs  T(C): 36.7 (06-30-25 @ 19:15), Max: 36.8 (06-30-25 @ 08:25)  T(F): 98.1 (06-30-25 @ 19:15), Max: 98.3 (06-30-25 @ 08:25)  HR: --  BP: --  BP(mean): --  RR: 16 (06-30-25 @ 20:49) (16 - 16)  SpO2: --    Orthostatic VS  06-30-25 @ 19:15  Lying BP: --/-- HR: --  Sitting BP: 124/97 HR: 90  Standing BP: 124/91 HR: 101  Site: --  Mode: --  Orthostatic VS  06-30-25 @ 08:25  Lying BP: --/-- HR: --  Sitting BP: 98/77 HR: 91  Standing BP: 129/77 HR: 100  Site: --  Mode: --  Orthostatic VS  06-29-25 @ 19:56  Lying BP: --/-- HR: --  Sitting BP: 125/77 HR: 92  Standing BP: 120/85 HR: 106  Site: --  Mode: --  Orthostatic VS  06-29-25 @ 08:35  Lying BP: --/-- HR: --  Sitting BP: 122/79 HR: 91  Standing BP: 112/76 HR: 109  Site: upper left arm  Mode: electronic    Lipid Panel: Date/Time: 06-26-25 @ 08:58  Cholesterol, Serum: 116  LDL Cholesterol Calculated: 32  HDL Cholesterol, Serum: 70  Total Cholesterol/HDL Ration Measurement: --  Triglycerides, Serum: 66   BMI: BMI (kg/m2): 22.3 (06-28-25 @ 16:43)  HbA1c: A1C with Estimated Average Glucose Result: 5.4 % (06-26-25 @ 08:58)    Glucose:   BP: --Vital Signs Last 24 Hrs  T(C): 36.5 (07-01-25 @ 08:37), Max: 36.7 (06-30-25 @ 19:15)  T(F): 97.7 (07-01-25 @ 08:37), Max: 98.1 (06-30-25 @ 19:15)  HR: --  BP: --  BP(mean): --  RR: 16 (07-01-25 @ 08:37) (16 - 16)  SpO2: --    Orthostatic VS  07-01-25 @ 08:37  Lying BP: --/-- HR: --  Sitting BP: 120/70 HR: 99  Standing BP: 112/79 HR: 118  Site: --  Mode: electronic  Orthostatic VS  06-30-25 @ 19:15  Lying BP: --/-- HR: --  Sitting BP: 124/97 HR: 90  Standing BP: 124/91 HR: 101  Site: --  Mode: --  Orthostatic VS  06-30-25 @ 08:25  Lying BP: --/-- HR: --  Sitting BP: 98/77 HR: 91  Standing BP: 129/77 HR: 100  Site: --  Mode: --  Orthostatic VS  06-29-25 @ 19:56  Lying BP: --/-- HR: --  Sitting BP: 125/77 HR: 92  Standing BP: 120/85 HR: 106  Site: --  Mode: --    Lipid Panel: Date/Time: 06-26-25 @ 08:58  Cholesterol, Serum: 116  LDL Cholesterol Calculated: 32  HDL Cholesterol, Serum: 70  Total Cholesterol/HDL Ration Measurement: --  Triglycerides, Serum: 66

## 2025-07-01 NOTE — BH INPATIENT PSYCHIATRY PROGRESS NOTE - NSBHATTESTBILLING_PSY_A_CORE
99285-Emergency department visit - high complexity 10652-Ingffqxubj OBS or IP - moderate complexity OR 35-49 mins

## 2025-07-01 NOTE — BH INPATIENT PSYCHIATRY PROGRESS NOTE - PRN MEDS
MEDICATIONS  (PRN):  haloperidol     Tablet 5 milliGRAM(s) Oral every 6 hours PRN Mild-moderate agitation, secondary to psychosis, rody, or poor impulse control  haloperidol    Injectable 5 milliGRAM(s) IntraMuscular once PRN Severe agitation secondary to  neuropsychiatric disorder  LORazepam     Tablet 2 milliGRAM(s) Oral every 8 hours PRN Anxiety  LORazepam   Injectable 2 milliGRAM(s) IntraMuscular once PRN Severe Aggression

## 2025-07-01 NOTE — BH INPATIENT PSYCHIATRY PROGRESS NOTE - CURRENT MEDICATION
MEDICATIONS  (STANDING):  risperiDONE   Tablet 2 milliGRAM(s) Oral at bedtime    MEDICATIONS  (PRN):  haloperidol     Tablet 5 milliGRAM(s) Oral every 6 hours PRN Mild-moderate agitation, secondary to psychosis, rody, or poor impulse control  haloperidol    Injectable 5 milliGRAM(s) IntraMuscular once PRN Severe agitation secondary to  neuropsychiatric disorder  LORazepam     Tablet 2 milliGRAM(s) Oral every 8 hours PRN Anxiety  LORazepam   Injectable 2 milliGRAM(s) IntraMuscular once PRN Severe Aggression

## 2025-07-01 NOTE — BH INPATIENT PSYCHIATRY PROGRESS NOTE - NSBHFUPINTERVALHXFT_PSY_A_CORE
Patient was seen for psychosis.   Chart, and medication reviewed. Case discussed with nursing team.  No interval events. Patient remains compliant with standing medications, no SE reported.  Tolerating  Risperdal 2mg qhs,  No behavioral concerns, no prns. Appetite and sleep maintained.  Patient was seen on unit.  He is calm, cooperative, pleasant and amenable to interview.  Patient reports that he is doing well offers no complaints. Denies SI/HI, no plan or intent on unit. Pt reports that he feels better and is looking forward to being dc and seeing his family, reports that his parents has been visiting him here at the hospital, but he wants to be home for 4th of July.  Denies pain in  right leg, no swelling, ambulating with walker.  No overt prominent psychotic trend. Patient denies AVH, delusions, or paranoia. No acute medical concerns. He offers no complaints. Discussed readiness for dc, pt reports feeling stable for dc this week. Patient is provided "safety plan" packet.  Discussed dispo plans with SW. Continue to monitor.      Patient was seen for psychosis.   Chart, and medication reviewed. Case discussed with nursing team.  No interval events. Patient remains compliant with standing medications, no SE reported.  Tolerating  Risperdal 2mg qhs,  No behavioral concerns, no prns. Appetite and sleep maintained.  Patient was seen on unit.  He is calm, cooperative, pleasant and amenable to interview.  Patient reports that he is doing well offers no complaints. Denies SI/HI, no plan or intent on unit. Pt reports that he feels better and is looking forward to being dc and seeing his family, reports that his parents has been visiting him here at the hospital, but he wants to be home for 4th of July.  Denies pain in  right leg, no swelling, ambulating with walker.  No overt prominent psychotic trend. Patient denies AVH, delusions, or paranoia. No acute medical concerns. He offers no complaints. Discussed readiness for dc, pt reports feeling stable for dc this week. Patient is provided "safety plan" packet.  Discussed dispo plans with SW,  possible outpatient to [facility]. Continue to monitor.

## 2025-07-01 NOTE — BH INPATIENT PSYCHIATRY PROGRESS NOTE - NSBHCHARTREVIEWVS_PSY_A_CORE FT
Vital Signs Last 24 Hrs  T(C): 36.7 (06-30-25 @ 19:15), Max: 36.8 (06-30-25 @ 08:25)  T(F): 98.1 (06-30-25 @ 19:15), Max: 98.3 (06-30-25 @ 08:25)  HR: --  BP: --  BP(mean): --  RR: 16 (06-30-25 @ 20:49) (16 - 16)  SpO2: --    Orthostatic VS  06-30-25 @ 19:15  Lying BP: --/-- HR: --  Sitting BP: 124/97 HR: 90  Standing BP: 124/91 HR: 101  Site: --  Mode: --  Orthostatic VS  06-30-25 @ 08:25  Lying BP: --/-- HR: --  Sitting BP: 98/77 HR: 91  Standing BP: 129/77 HR: 100  Site: --  Mode: --  Orthostatic VS  06-29-25 @ 19:56  Lying BP: --/-- HR: --  Sitting BP: 125/77 HR: 92  Standing BP: 120/85 HR: 106  Site: --  Mode: --  Orthostatic VS  06-29-25 @ 08:35  Lying BP: --/-- HR: --  Sitting BP: 122/79 HR: 91  Standing BP: 112/76 HR: 109  Site: upper left arm  Mode: electronic   Vital Signs Last 24 Hrs  T(C): 36.5 (07-01-25 @ 08:37), Max: 36.7 (06-30-25 @ 19:15)  T(F): 97.7 (07-01-25 @ 08:37), Max: 98.1 (06-30-25 @ 19:15)  HR: --  BP: --  BP(mean): --  RR: 16 (07-01-25 @ 08:37) (16 - 16)  SpO2: --    Orthostatic VS  07-01-25 @ 08:37  Lying BP: --/-- HR: --  Sitting BP: 120/70 HR: 99  Standing BP: 112/79 HR: 118  Site: --  Mode: electronic  Orthostatic VS  06-30-25 @ 19:15  Lying BP: --/-- HR: --  Sitting BP: 124/97 HR: 90  Standing BP: 124/91 HR: 101  Site: --  Mode: --  Orthostatic VS  06-30-25 @ 08:25  Lying BP: --/-- HR: --  Sitting BP: 98/77 HR: 91  Standing BP: 129/77 HR: 100  Site: --  Mode: --  Orthostatic VS  06-29-25 @ 19:56  Lying BP: --/-- HR: --  Sitting BP: 125/77 HR: 92  Standing BP: 120/85 HR: 106  Site: --  Mode: --

## 2025-07-02 PROCEDURE — 99232 SBSQ HOSP IP/OBS MODERATE 35: CPT

## 2025-07-02 RX ADMIN — Medication 2 MILLIGRAM(S): at 20:28

## 2025-07-02 NOTE — BH INPATIENT PSYCHIATRY PROGRESS NOTE - NSBHMSESPABN_PSY_A_CORE
Decreased productivity/Increased latency

## 2025-07-02 NOTE — BH INPATIENT PSYCHIATRY PROGRESS NOTE - NSBHCHARTREVIEWINVESTIGATE_PSY_A_CORE FT
CT Head resulted unremarkable  Xray of right foot/knee unremarkable

## 2025-07-02 NOTE — BH INPATIENT PSYCHIATRY PROGRESS NOTE - NSTXDISORGGOAL_PSY_ALL_CORE
Will demonstrate related thoughts for 5 min in conversation

## 2025-07-02 NOTE — BH INPATIENT PSYCHIATRY PROGRESS NOTE - NSBHPSYCHOLCOGABN_PSY_A_CORE
disoriented to situation

## 2025-07-02 NOTE — BH INPATIENT PSYCHIATRY PROGRESS NOTE - NSBHMSETHTCONTENT_PSY_A_CORE
denies all symptoms/Unremarkable

## 2025-07-02 NOTE — BH INPATIENT PSYCHIATRY PROGRESS NOTE - OTHER
ambulating with walker, reports pain in right knee and foot 

## 2025-07-02 NOTE — BH INPATIENT PSYCHIATRY PROGRESS NOTE - NSBHCHARTREVIEWLAB_PSY_A_CORE FT
Admission labs reviewed no acute findings  BAL <10   utox negative  UA unremarkable  TSH 1.03  WBC mildly elevated at 10.61 but pt denying constitutional sxs,  

## 2025-07-02 NOTE — BH DISCHARGE NOTE NURSING/SOCIAL WORK/PSYCH REHAB - DISCHARGE INSTRUCTIONS AFTERCARE APPOINTMENTS
In order to check the location, date, or time of your aftercare appointment, please refer to your Discharge Instructions Document given to you upon leaving the hospital.  If you have lost the instructions please call [phone]

## 2025-07-02 NOTE — BH DISCHARGE NOTE NURSING/SOCIAL WORK/PSYCH REHAB - FINANCIAL ASSISTANCE
[facility] provides services at a reduced cost to those who are determined to be eligible through [facility]’s financial assistance program. Information regarding [facility]’s financial assistance program can be found by going to [email or web address] or by calling [phone].

## 2025-07-02 NOTE — BH INPATIENT PSYCHIATRY PROGRESS NOTE - ATTENDING COMMENTS
Chart was reviewed and case discussed with the Psych NP. I agree with the assessment and plan as documented in the Psych NP's assessment note and was directly involved in medical decision making.  

## 2025-07-02 NOTE — BH INPATIENT PSYCHIATRY PROGRESS NOTE - NSBHFUPINTERVALHXFT_PSY_A_CORE
pt seen for psychosis. Chart and medications reviewed. Discussed pt with nursing team. No acute events overnight, no prn needed, and preforming ADLs without difficulty.   Pt was pleasant, cooperative, and appropriately conversant during today’s interview in the day room. Describes today’s mood as “happy”, and feels his stay at the facility is overall beneficial. Compliant and tolerating Risperdal 2mg qhs without side effects, however doesn’t feel any different since starting them. Dad visited yesterday and is happy with progress as per pt. Attending groups and community. Seen by PT yesterday, and states his knee is getting better. Pt is sleeping without difficulty and appetite remains intact. States he is socializing and able to keep entertained with books, and word search puzzles. Denies any difficulty with ADLs, anxiety, depression, low moods, racing thoughts, impulsivity, auditory or visual hallucinations, paranoid, SI/HI. Discussed readiness for discharge, pt given “safety plan” packet Denies leg pain b/l and ambulating with walker. Will continue to monitor.

## 2025-07-02 NOTE — BH INPATIENT PSYCHIATRY PROGRESS NOTE - NSBHCHARTREVIEWVS_PSY_A_CORE FT
Vital Signs Last 24 Hrs  T(C): 36.8 (07-01-25 @ 19:49), Max: 36.8 (07-01-25 @ 19:49)  T(F): 98.2 (07-01-25 @ 19:49), Max: 98.2 (07-01-25 @ 19:49)  HR: --  BP: --  BP(mean): --  RR: 16 (07-01-25 @ 08:37) (16 - 16)  SpO2: --    Orthostatic VS  07-01-25 @ 19:49  Lying BP: --/-- HR: --  Sitting BP: 121/81 HR: 93  Standing BP: 109/84 HR: 115  Site: --  Mode: --  Orthostatic VS  07-01-25 @ 08:37  Lying BP: --/-- HR: --  Sitting BP: 120/70 HR: 99  Standing BP: 112/79 HR: 118  Site: --  Mode: electronic  Orthostatic VS  06-30-25 @ 19:15  Lying BP: --/-- HR: --  Sitting BP: 124/97 HR: 90  Standing BP: 124/91 HR: 101  Site: --  Mode: --  Orthostatic VS  06-30-25 @ 08:25  Lying BP: --/-- HR: --  Sitting BP: 98/77 HR: 91  Standing BP: 129/77 HR: 100  Site: --  Mode: --   Vital Signs Last 24 Hrs  T(C): 36.1 (07-02-25 @ 08:11), Max: 36.8 (07-01-25 @ 19:49)  T(F): 97 (07-02-25 @ 08:11), Max: 98.2 (07-01-25 @ 19:49)  HR: --  BP: --  BP(mean): --  RR: 16 (07-02-25 @ 08:11) (16 - 16)  SpO2: --    Orthostatic VS  07-02-25 @ 08:11  Lying BP: --/-- HR: --  Sitting BP: 121/74 HR: 89  Standing BP: 114/78 HR: 99  Site: --  Mode: --  Orthostatic VS  07-01-25 @ 19:49  Lying BP: --/-- HR: --  Sitting BP: 121/81 HR: 93  Standing BP: 109/84 HR: 115  Site: --  Mode: --  Orthostatic VS  07-01-25 @ 08:37  Lying BP: --/-- HR: --  Sitting BP: 120/70 HR: 99  Standing BP: 112/79 HR: 118  Site: --  Mode: electronic  Orthostatic VS  06-30-25 @ 19:15  Lying BP: --/-- HR: --  Sitting BP: 124/97 HR: 90  Standing BP: 124/91 HR: 101  Site: --  Mode: --

## 2025-07-02 NOTE — BH INPATIENT PSYCHIATRY PROGRESS NOTE - NSBHMETABOLIC_PSY_ALL_CORE_FT
BMI: BMI (kg/m2): 22.3 (06-28-25 @ 16:43)  HbA1c: A1C with Estimated Average Glucose Result: 5.4 % (06-26-25 @ 08:58)    Glucose:   BP: --Vital Signs Last 24 Hrs  T(C): 36.8 (07-01-25 @ 19:49), Max: 36.8 (07-01-25 @ 19:49)  T(F): 98.2 (07-01-25 @ 19:49), Max: 98.2 (07-01-25 @ 19:49)  HR: --  BP: --  BP(mean): --  RR: 16 (07-01-25 @ 08:37) (16 - 16)  SpO2: --    Orthostatic VS  07-01-25 @ 19:49  Lying BP: --/-- HR: --  Sitting BP: 121/81 HR: 93  Standing BP: 109/84 HR: 115  Site: --  Mode: --  Orthostatic VS  07-01-25 @ 08:37  Lying BP: --/-- HR: --  Sitting BP: 120/70 HR: 99  Standing BP: 112/79 HR: 118  Site: --  Mode: electronic  Orthostatic VS  06-30-25 @ 19:15  Lying BP: --/-- HR: --  Sitting BP: 124/97 HR: 90  Standing BP: 124/91 HR: 101  Site: --  Mode: --  Orthostatic VS  06-30-25 @ 08:25  Lying BP: --/-- HR: --  Sitting BP: 98/77 HR: 91  Standing BP: 129/77 HR: 100  Site: --  Mode: --    Lipid Panel: Date/Time: 06-26-25 @ 08:58  Cholesterol, Serum: 116  LDL Cholesterol Calculated: 32  HDL Cholesterol, Serum: 70  Total Cholesterol/HDL Ration Measurement: --  Triglycerides, Serum: 66   BMI: BMI (kg/m2): 22.3 (06-28-25 @ 16:43)  HbA1c: A1C with Estimated Average Glucose Result: 5.4 % (06-26-25 @ 08:58)    Glucose:   BP: --Vital Signs Last 24 Hrs  T(C): 36.1 (07-02-25 @ 08:11), Max: 36.8 (07-01-25 @ 19:49)  T(F): 97 (07-02-25 @ 08:11), Max: 98.2 (07-01-25 @ 19:49)  HR: --  BP: --  BP(mean): --  RR: 16 (07-02-25 @ 08:11) (16 - 16)  SpO2: --    Orthostatic VS  07-02-25 @ 08:11  Lying BP: --/-- HR: --  Sitting BP: 121/74 HR: 89  Standing BP: 114/78 HR: 99  Site: --  Mode: --  Orthostatic VS  07-01-25 @ 19:49  Lying BP: --/-- HR: --  Sitting BP: 121/81 HR: 93  Standing BP: 109/84 HR: 115  Site: --  Mode: --  Orthostatic VS  07-01-25 @ 08:37  Lying BP: --/-- HR: --  Sitting BP: 120/70 HR: 99  Standing BP: 112/79 HR: 118  Site: --  Mode: electronic  Orthostatic VS  06-30-25 @ 19:15  Lying BP: --/-- HR: --  Sitting BP: 124/97 HR: 90  Standing BP: 124/91 HR: 101  Site: --  Mode: --    Lipid Panel: Date/Time: 06-26-25 @ 08:58  Cholesterol, Serum: 116  LDL Cholesterol Calculated: 32  HDL Cholesterol, Serum: 70  Total Cholesterol/HDL Ration Measurement: --  Triglycerides, Serum: 66

## 2025-07-02 NOTE — BH DISCHARGE NOTE NURSING/SOCIAL WORK/PSYCH REHAB - NSDCPRRECOMMEND_PSY_ALL_CORE
Please follow up your treatment with [facility]- Early Treatment Program.

## 2025-07-02 NOTE — BH DISCHARGE NOTE NURSING/SOCIAL WORK/PSYCH REHAB - NSTOBACCOOTHER_PSY_ALL_CORE_FT
You are eligible for [facility]’s Smoking Cessation Group.   The next date available for this group is: Friday 7/11 @ 3pm VIRTUAL  To join, either type in the hyperlink below to your browser’s URL, or sign into zoom and enter the Meeting ID Below.  If you have any issues getting onto the zoom group, please call JENNIFER FROST at [phone].  Meeting ID: [id number]  [email or web address]

## 2025-07-02 NOTE — BH INPATIENT PSYCHIATRY PROGRESS NOTE - NSDCCRITERIA_PSY_ALL_CORE
Symptom Stabilization  Optimize medications  CGI<=3  Outpatient services f/u  

## 2025-07-02 NOTE — BH INPATIENT PSYCHIATRY PROGRESS NOTE - NSBHASSESSSUMMFT_PSY_ALL_CORE
Patient is a 29 year old single male, no dependents.  Patient domiciles in private residence with parents and siblings, currently employed.  Patient has no formal PPH. No substance use, admitting utox is negative. Patient no prior inpatient hospitalizations. Patient arrived by [employer] for c/o knee pain, he came to the ED this evening because he injured his knee while playing basket ball, xray resulted negative; psychiatry consulted for paranoia and agitation. Patient is hospitalized with a primary problem of psychotic decompensation.      Plan:  >Legal: 9.27  >Obs: Routine, no current SI. no need for CO, patient not expected to pose risk to self or others in controlled inpatient setting  >Psychiatric Meds:   -Risperdal 1mg qhs for psychosis  PRN medications:  For agitation please attempt verbal de-escalation and behavioral intervention first. If patient does not respond to above, may give recommended pp q6h prn, if no contraindications. If patient is refusing PO, remains an imminent danger to self or others and If Patient's  qtc <500, can escalate to IM formulation. If IM antipsychotic is administered, please perform follow-up ECG for QTc  Ativan 2mg oral Q6HR PRN for agitation and anxiety.  Haldol 5mg oral Q6HR PRN for agitation.   >Labs: Admission labs reviewed, no acute findings. WBC mildly elevated but pt denying constitutional sxs, U-tox negative.  Labs pending for today: WBC, A1c and Lipid panel. Hold antipsychotics if QTc >500  >Medical:   No acute concerns. No consultations needed at this time. No indication for CIWA. low current suspicion for intoxication/withdrawal. Patient with consistently stable VS, no visible physical symptoms of withdrawal.   During the course of treatment, will collaborate with medical team to manage medical issues.  >Consult: Physical therapy  >Diet: Regular  >Social: milieu/structured therapy  >Treatment Interventions: Groups and Individual Therapy/CBT, Motivational counseling for substance abuse related issues.   >Dispo: Collateral and dispo planning pending further symptom and medication optimization  
Patient is a 29 year old single male, no dependents.  Patient domiciles in private residence with parents and siblings, currently employed.  Patient has no formal PPH. No substance use, admitting utox is negative. Patient no prior inpatient hospitalizations. Patient arrived by [employer] for c/o knee pain, he came to the ED this evening because he injured his knee while playing basket ball, xray resulted negative; psychiatry consulted for paranoia and agitation. Patient is hospitalized with a primary problem of psychotic decompensation.    6/27: Pt denies all symptoms, declines standing medications.  No overt psychotic trend. No behavioral concerns  7/1: Pt reports feeling better, denies SI/HI, no overt psychotic trend. Compliant with medications.  Working on dc for Thursday 7/3  Plan:  >Legal: 9.27  >Obs: Routine, no current SI. no need for CO, patient not expected to pose risk to self or others in controlled inpatient setting  >Psychiatric Meds:   -Risperdal 1mg qhs, titrate 2mg on 6/30 for psychosis  PRN medications:  For agitation please attempt verbal de-escalation and behavioral intervention first. If patient does not respond to above, may give recommended pp q6h prn, if no contraindications. If patient is refusing PO, remains an imminent danger to self or others and If Patient's  qtc <500, can escalate to IM formulation. If IM antipsychotic is administered, please perform follow-up ECG for QTc  Ativan 2mg oral Q6HR PRN for agitation and anxiety.  Haldol 5mg oral Q6HR PRN for agitation.   >Labs: Admission labs reviewed, no acute findings. WBC mildly elevated but pt denying constitutional sxs, U-tox negative.  Labs pending for today: WBC, A1c and Lipid panel. Hold antipsychotics if QTc >500  >Medical:   No acute concerns. No consultations needed at this time. No indication for CIWA. low current suspicion for intoxication/withdrawal. Patient with consistently stable VS, no visible physical symptoms of withdrawal.   During the course of treatment, will collaborate with medical team to manage medical issues.  >Consult: Physical therapy  >Diet: Regular  >Social: milieu/structured therapy  >Treatment Interventions: Groups and Individual Therapy/CBT, Motivational counseling for substance abuse related issues.   >Dispo: Collateral and dispo planning pending further symptom and medication optimization.  Possible dc on Thursday 7/3  
Patient is a 29 year old single male, no dependents.  Patient domiciles in private residence with parents and siblings, currently employed.  Patient has no formal PPH. No substance use, admitting utox is negative. Patient no prior inpatient hospitalizations. Patient arrived by [employer] for c/o knee pain, he came to the ED this evening because he injured his knee while playing basket ball, xray resulted negative; psychiatry consulted for paranoia and agitation. Patient is hospitalized with a primary problem of psychotic decompensation.    6/27: Pt denies all symptoms, declines standing medications.  No overt psychotic trend. No behavioral concerns  Plan:  >Legal: 9.27  >Obs: Routine, no current SI. no need for CO, patient not expected to pose risk to self or others in controlled inpatient setting  >Psychiatric Meds:   -Risperdal 1mg qhs for psychosis  PRN medications:  For agitation please attempt verbal de-escalation and behavioral intervention first. If patient does not respond to above, may give recommended pp q6h prn, if no contraindications. If patient is refusing PO, remains an imminent danger to self or others and If Patient's  qtc <500, can escalate to IM formulation. If IM antipsychotic is administered, please perform follow-up ECG for QTc  Ativan 2mg oral Q6HR PRN for agitation and anxiety.  Haldol 5mg oral Q6HR PRN for agitation.   >Labs: Admission labs reviewed, no acute findings. WBC mildly elevated but pt denying constitutional sxs, U-tox negative.  Labs pending for today: WBC, A1c and Lipid panel. Hold antipsychotics if QTc >500  >Medical:   No acute concerns. No consultations needed at this time. No indication for CIWA. low current suspicion for intoxication/withdrawal. Patient with consistently stable VS, no visible physical symptoms of withdrawal.   During the course of treatment, will collaborate with medical team to manage medical issues.  >Consult: Physical therapy  >Diet: Regular  >Social: milieu/structured therapy  >Treatment Interventions: Groups and Individual Therapy/CBT, Motivational counseling for substance abuse related issues.   >Dispo: Collateral and dispo planning pending further symptom and medication optimization  
Patient is a 29 year old single male, no dependents.  Patient domiciles in private residence with parents and siblings, currently employed.  Patient has no formal PPH. No substance use, admitting utox is negative. Patient no prior inpatient hospitalizations. Patient arrived by [employer] for c/o knee pain, he came to the ED this evening because he injured his knee while playing basket ball, xray resulted negative; psychiatry consulted for paranoia and agitation. Patient is hospitalized with a primary problem of psychotic decompensation.    6/27: Pt denies all symptoms, declines standing medications.  No overt psychotic trend. No behavioral concerns  Plan:  >Legal: 9.27  >Obs: Routine, no current SI. no need for CO, patient not expected to pose risk to self or others in controlled inpatient setting  >Psychiatric Meds:   -Risperdal 1mg qhs for psychosis  PRN medications:  For agitation please attempt verbal de-escalation and behavioral intervention first. If patient does not respond to above, may give recommended pp q6h prn, if no contraindications. If patient is refusing PO, remains an imminent danger to self or others and If Patient's  qtc <500, can escalate to IM formulation. If IM antipsychotic is administered, please perform follow-up ECG for QTc  Ativan 2mg oral Q6HR PRN for agitation and anxiety.  Haldol 5mg oral Q6HR PRN for agitation.   >Labs: Admission labs reviewed, no acute findings. WBC mildly elevated but pt denying constitutional sxs, U-tox negative.  Labs pending for today: WBC, A1c and Lipid panel. Hold antipsychotics if QTc >500  >Medical:   No acute concerns. No consultations needed at this time. No indication for CIWA. low current suspicion for intoxication/withdrawal. Patient with consistently stable VS, no visible physical symptoms of withdrawal.   During the course of treatment, will collaborate with medical team to manage medical issues.  >Consult: Physical therapy  >Diet: Regular  >Social: milieu/structured therapy  >Treatment Interventions: Groups and Individual Therapy/CBT, Motivational counseling for substance abuse related issues.   >Dispo: Collateral and dispo planning pending further symptom and medication optimization  
Patient is a 29 year old single male, no dependents.  Patient domiciles in private residence with parents and siblings, currently employed.  Patient has no formal PPH. No substance use, admitting utox is negative. Patient no prior inpatient hospitalizations. Patient arrived by [employer] for c/o knee pain, he came to the ED this evening because he injured his knee while playing basket ball, xray resulted negative; psychiatry consulted for paranoia and agitation. Patient is hospitalized with a primary problem of psychotic decompensation.    6/27: Pt denies all symptoms, declines standing medications.  No overt psychotic trend. No behavioral concerns  Plan:  >Legal: 9.27  >Obs: Routine, no current SI. no need for CO, patient not expected to pose risk to self or others in controlled inpatient setting  >Psychiatric Meds:   -Risperdal 1mg qhs, titrate 2mg on 6/30 for psychosis  PRN medications:  For agitation please attempt verbal de-escalation and behavioral intervention first. If patient does not respond to above, may give recommended pp q6h prn, if no contraindications. If patient is refusing PO, remains an imminent danger to self or others and If Patient's  qtc <500, can escalate to IM formulation. If IM antipsychotic is administered, please perform follow-up ECG for QTc  Ativan 2mg oral Q6HR PRN for agitation and anxiety.  Haldol 5mg oral Q6HR PRN for agitation.   >Labs: Admission labs reviewed, no acute findings. WBC mildly elevated but pt denying constitutional sxs, U-tox negative.  Labs pending for today: WBC, A1c and Lipid panel. Hold antipsychotics if QTc >500  >Medical:   No acute concerns. No consultations needed at this time. No indication for CIWA. low current suspicion for intoxication/withdrawal. Patient with consistently stable VS, no visible physical symptoms of withdrawal.   During the course of treatment, will collaborate with medical team to manage medical issues.  >Consult: Physical therapy  >Diet: Regular  >Social: milieu/structured therapy  >Treatment Interventions: Groups and Individual Therapy/CBT, Motivational counseling for substance abuse related issues.   >Dispo: Collateral and dispo planning pending further symptom and medication optimization  
Patient is a 29 year old single male, no dependents.  Patient domiciles in private residence with parents and siblings, currently employed.  Patient has no formal PPH. No substance use, admitting utox is negative. Patient no prior inpatient hospitalizations. Patient arrived by [employer] for c/o knee pain, he came to the ED this evening because he injured his knee while playing basket ball, xray resulted negative; psychiatry consulted for paranoia and agitation. Patient is hospitalized with a primary problem of psychotic decompensation.    6/27: Pt denies all symptoms, declines standing medications.  No overt psychotic trend. No behavioral concerns  7/1: Pt reports feeling better, denies SI/HI, no overt psychotic trend. Compliant with medications.  Working on dc for Thursday 7/3  Plan:  >Legal: 9.27  >Obs: Routine, no current SI. no need for CO, patient not expected to pose risk to self or others in controlled inpatient setting  >Psychiatric Meds:   -Risperdal 1mg qhs, titrate 2mg on 6/30 for psychosis  PRN medications:  For agitation please attempt verbal de-escalation and behavioral intervention first. If patient does not respond to above, may give recommended pp q6h prn, if no contraindications. If patient is refusing PO, remains an imminent danger to self or others and If Patient's  qtc <500, can escalate to IM formulation. If IM antipsychotic is administered, please perform follow-up ECG for QTc  Ativan 2mg oral Q6HR PRN for agitation and anxiety.  Haldol 5mg oral Q6HR PRN for agitation.   >Labs: Admission labs reviewed, no acute findings. WBC mildly elevated but pt denying constitutional sxs, U-tox negative.  Labs pending for today: WBC, A1c and Lipid panel. Hold antipsychotics if QTc >500  >Medical:   No acute concerns. No consultations needed at this time. No indication for CIWA. low current suspicion for intoxication/withdrawal. Patient with consistently stable VS, no visible physical symptoms of withdrawal.   During the course of treatment, will collaborate with medical team to manage medical issues.  >Consult: Physical therapy  >Diet: Regular  >Social: milieu/structured therapy  >Treatment Interventions: Groups and Individual Therapy/CBT, Motivational counseling for substance abuse related issues.   >Dispo: Collateral and dispo planning pending further symptom and medication optimization.  Possible dc on Thursday 7/3  
Patient is a 29 year old single male, no dependents.  Patient domiciles in private residence with parents and siblings, currently employed.  Patient has no formal PPH. No substance use, admitting utox is negative. Patient no prior inpatient hospitalizations. Patient arrived by [employer] for c/o knee pain, he came to the ED this evening because he injured his knee while playing basket ball, xray resulted negative; psychiatry consulted for paranoia and agitation. Patient is hospitalized with a primary problem of psychotic decompensation.    6/27: Pt denies all symptoms, declines standing medications.  No overt psychotic trend. No behavioral concerns  Plan:  >Legal: 9.27  >Obs: Routine, no current SI. no need for CO, patient not expected to pose risk to self or others in controlled inpatient setting  >Psychiatric Meds:   -Risperdal 1mg qhs for psychosis  PRN medications:  For agitation please attempt verbal de-escalation and behavioral intervention first. If patient does not respond to above, may give recommended pp q6h prn, if no contraindications. If patient is refusing PO, remains an imminent danger to self or others and If Patient's  qtc <500, can escalate to IM formulation. If IM antipsychotic is administered, please perform follow-up ECG for QTc  Ativan 2mg oral Q6HR PRN for agitation and anxiety.  Haldol 5mg oral Q6HR PRN for agitation.   >Labs: Admission labs reviewed, no acute findings. WBC mildly elevated but pt denying constitutional sxs, U-tox negative.  Labs pending for today: WBC, A1c and Lipid panel. Hold antipsychotics if QTc >500  >Medical:   No acute concerns. No consultations needed at this time. No indication for CIWA. low current suspicion for intoxication/withdrawal. Patient with consistently stable VS, no visible physical symptoms of withdrawal.   During the course of treatment, will collaborate with medical team to manage medical issues.  >Consult: Physical therapy  >Diet: Regular  >Social: milieu/structured therapy  >Treatment Interventions: Groups and Individual Therapy/CBT, Motivational counseling for substance abuse related issues.   >Dispo: Collateral and dispo planning pending further symptom and medication optimization

## 2025-07-02 NOTE — BH DISCHARGE NOTE NURSING/SOCIAL WORK/PSYCH REHAB - PATIENT PORTAL LINK FT
You can access the FollowMyHealth Patient Portal offered by [facility] by registering at the following website: [email or web address]. By joining [facility]’s FollowMyHealth portal, you will also be able to view your health information using other applications (apps) compatible with our system.

## 2025-07-02 NOTE — BH INPATIENT PSYCHIATRY PROGRESS NOTE - NSBHFUPINTERVALCCFT_PSY_A_CORE
psychosis/bizarre behaviors

## 2025-07-02 NOTE — BH INPATIENT PSYCHIATRY PROGRESS NOTE - NSBHATTESTAPPBILLTIME_PSY_A_CORE
I attest my time as TIFFANIE is greater than 50% of the total combined time spent on qualifying patient care activities. I have reviewed and verified the documentation.

## 2025-07-02 NOTE — BH DISCHARGE NOTE NURSING/SOCIAL WORK/PSYCH REHAB - NSBHDCADDR1FT_A_CORE
[facility] [place] [facility] through [address] - 1st floor   [phone] [place] [address]

## 2025-07-02 NOTE — BH INPATIENT PSYCHIATRY PROGRESS NOTE - NSTXDCOTHRGOAL_PSY_ALL_CORE
Pt will engage meaningfully with writer to identify a safe discharge plan. Pt will comply with recommended tx plan and medications for 5 days, identify 2 benefits for adhering to tx.

## 2025-07-03 VITALS — RESPIRATION RATE: 16 BRPM | TEMPERATURE: 98 F

## 2025-07-10 PROBLEM — M67.90 UNSPECIFIED DISORDER OF SYNOVIUM AND TENDON, UNSPECIFIED SITE: Chronic | Status: ACTIVE | Noted: 2018-10-03

## 2025-07-10 PROBLEM — T14.8XXA OTHER INJURY OF UNSPECIFIED BODY REGION, INITIAL ENCOUNTER: Chronic | Status: ACTIVE | Noted: 2018-10-03

## 2025-07-10 PROBLEM — T14.90XA INJURY, UNSPECIFIED, INITIAL ENCOUNTER: Chronic | Status: ACTIVE | Noted: 2018-10-03

## 2025-07-10 PROBLEM — F12.90 CANNABIS USE, UNSPECIFIED, UNCOMPLICATED: Chronic | Status: ACTIVE | Noted: 2018-10-03
